# Patient Record
Sex: MALE | Race: WHITE | NOT HISPANIC OR LATINO | Employment: OTHER | URBAN - METROPOLITAN AREA
[De-identification: names, ages, dates, MRNs, and addresses within clinical notes are randomized per-mention and may not be internally consistent; named-entity substitution may affect disease eponyms.]

---

## 2017-04-26 PROBLEM — D72.10 EOSINOPHILIA: Status: ACTIVE | Noted: 2017-04-26

## 2020-08-06 ENCOUNTER — OFFICE VISIT (OUTPATIENT)
Dept: ENDOCRINOLOGY | Facility: CLINIC | Age: 75
End: 2020-08-06
Payer: MEDICARE

## 2020-08-06 VITALS
WEIGHT: 188.5 LBS | BODY MASS INDEX: 25.53 KG/M2 | DIASTOLIC BLOOD PRESSURE: 78 MMHG | HEART RATE: 66 BPM | HEIGHT: 72 IN | SYSTOLIC BLOOD PRESSURE: 132 MMHG

## 2020-08-06 DIAGNOSIS — C64.9 RENAL CELL CARCINOMA, UNSPECIFIED LATERALITY: ICD-10-CM

## 2020-08-06 DIAGNOSIS — N18.30 STAGE 3 CHRONIC KIDNEY DISEASE: ICD-10-CM

## 2020-08-06 DIAGNOSIS — E78.2 MIXED HYPERLIPIDEMIA: ICD-10-CM

## 2020-08-06 DIAGNOSIS — E11.59 TYPE 2 DIABETES MELLITUS WITH OTHER CIRCULATORY COMPLICATION, WITHOUT LONG-TERM CURRENT USE OF INSULIN: Primary | ICD-10-CM

## 2020-08-06 PROBLEM — E11.9 TYPE 2 DIABETES MELLITUS WITHOUT COMPLICATION, WITHOUT LONG-TERM CURRENT USE OF INSULIN: Status: ACTIVE | Noted: 2020-08-06

## 2020-08-06 PROCEDURE — 1126F PR PAIN SEVERITY QUANTIFIED, NO PAIN PRESENT: ICD-10-PCS | Mod: S$GLB,,, | Performed by: INTERNAL MEDICINE

## 2020-08-06 PROCEDURE — 99204 OFFICE O/P NEW MOD 45 MIN: CPT | Mod: S$GLB,,, | Performed by: INTERNAL MEDICINE

## 2020-08-06 PROCEDURE — 1126F AMNT PAIN NOTED NONE PRSNT: CPT | Mod: S$GLB,,, | Performed by: INTERNAL MEDICINE

## 2020-08-06 PROCEDURE — 99204 PR OFFICE/OUTPT VISIT, NEW, LEVL IV, 45-59 MIN: ICD-10-PCS | Mod: S$GLB,,, | Performed by: INTERNAL MEDICINE

## 2020-08-06 PROCEDURE — 1101F PR PT FALLS ASSESS DOC 0-1 FALLS W/OUT INJ PAST YR: ICD-10-PCS | Mod: CPTII,S$GLB,, | Performed by: INTERNAL MEDICINE

## 2020-08-06 PROCEDURE — 99999 PR PBB SHADOW E&M-NEW PATIENT-LVL III: ICD-10-PCS | Mod: PBBFAC,,, | Performed by: INTERNAL MEDICINE

## 2020-08-06 PROCEDURE — 1159F MED LIST DOCD IN RCRD: CPT | Mod: S$GLB,,, | Performed by: INTERNAL MEDICINE

## 2020-08-06 PROCEDURE — 99999 PR PBB SHADOW E&M-NEW PATIENT-LVL III: CPT | Mod: PBBFAC,,, | Performed by: INTERNAL MEDICINE

## 2020-08-06 PROCEDURE — 1159F PR MEDICATION LIST DOCUMENTED IN MEDICAL RECORD: ICD-10-PCS | Mod: S$GLB,,, | Performed by: INTERNAL MEDICINE

## 2020-08-06 PROCEDURE — 1101F PT FALLS ASSESS-DOCD LE1/YR: CPT | Mod: CPTII,S$GLB,, | Performed by: INTERNAL MEDICINE

## 2020-08-06 RX ORDER — INSULIN PUMP SYRINGE, 3 ML
EACH MISCELLANEOUS
Qty: 1 EACH | Refills: 1 | Status: SHIPPED | OUTPATIENT
Start: 2020-08-06

## 2020-08-06 RX ORDER — GLIPIZIDE 5 MG/1
5 TABLET, FILM COATED, EXTENDED RELEASE ORAL 2 TIMES DAILY
COMMUNITY
End: 2020-08-06

## 2020-08-06 RX ORDER — ICOSAPENT ETHYL 1000 MG/1
2 CAPSULE ORAL DAILY
Qty: 180 CAPSULE | Refills: 3 | Status: SHIPPED | OUTPATIENT
Start: 2020-08-06 | End: 2020-11-06

## 2020-08-06 RX ORDER — LANOLIN ALCOHOL/MO/W.PET/CERES
400 CREAM (GRAM) TOPICAL DAILY
COMMUNITY
End: 2021-06-23

## 2020-08-06 RX ORDER — LINAGLIPTIN 5 MG/1
5 TABLET, FILM COATED ORAL DAILY
Qty: 90 TABLET | Refills: 3 | Status: SHIPPED | OUTPATIENT
Start: 2020-08-06 | End: 2020-10-15 | Stop reason: SDUPTHER

## 2020-08-06 RX ORDER — OMEPRAZOLE 20 MG/1
20 CAPSULE, DELAYED RELEASE ORAL DAILY
COMMUNITY

## 2020-08-06 RX ORDER — GLIPIZIDE 10 MG/1
10 TABLET, FILM COATED, EXTENDED RELEASE ORAL 2 TIMES DAILY
Qty: 60 TABLET | Refills: 11
Start: 2020-08-06 | End: 2020-09-28 | Stop reason: SDUPTHER

## 2020-08-06 RX ORDER — ASPIRIN 81 MG/1
81 TABLET ORAL DAILY
COMMUNITY
End: 2023-06-05

## 2020-08-06 NOTE — ASSESSMENT & PLAN NOTE
History of renal cell carcinoma status post partial nephrectomy years ago.  Per patient has been disease free for years. Now with declining egfr.  Follow-up with nephrology and urology.

## 2020-08-06 NOTE — PATIENT INSTRUCTIONS
1. Stop Jardiance    2. Establish care with renal/nephrology    3. Continue glipizide 10 mg BID    4. Start Tradjenta    5. Check BG 3x/wk and alternate time of day    6. Start   for triglycerides    7. Eat diabetic diet. Limit carbs to 30-45 grams per meal. Reduce snacking.

## 2020-08-06 NOTE — ASSESSMENT & PLAN NOTE
BG not at goal and last HbA1C 7.7%.   DM compliations include CKD stage 3 and CAD s/p PCIs.  Pt with worsening CKD.   Stop Jardiance since baseline egfr was <45 at onset and pt with declining renal function.   Continue glipizide 10 mg BID  Start Tradjenta 5 mg daily  Check BG 3x/wk and alternate time of day  Call MD in 2-3 weeks with blood glucose log if BG >180 or having lows.  Discussed if pt having persistent hyperglycemia, next step would be adding basal insulin.   Needs eye exam   Get records with last urine microalbumin.  Discussed proper foot care. Send to podiatry for foot care.  Counseled pt on low carb/low fat diet and to increase physical activity.  On statin

## 2020-08-06 NOTE — ASSESSMENT & PLAN NOTE
Advised pt, needs renal follow up. Pt with ? KATY on CKD vs progressive worsening of CKD. Avoid nephrotoxics. Stop Jardiance due to low egfr.

## 2020-08-06 NOTE — PROGRESS NOTES
"    Subjective:    Patient ID:  Felix Sheridan is a 74 y.o. male.    Chief Complaint:  Diabetes      Pt presents to establish care for T2 DM. Pt is accompanied by his wife who also provided some history.     With regards to the diabetes:    Onset of Type 2 diabetes mellitus was in 1970's. Initially managed for many years with lifestyle medications. Started pills in . [facility]     Known diabetic complications: cardiovascular disease s/p PCI in heart. Has pacemaker.   History of DKA: no  Cardiovascular risk factors: advanced age (older than 55 for men, 65 for women), diabetes mellitus, dyslipidemia, hypertension, male gender and sedentary lifestyle    Currently takin) Glipizide 10 mg BID  2) Jardiance 12.5 mg daily     Last HbA1C some where around 7.7% 2020 with Dr. Song (cardiology).   In the past took Metformin and glipizide. Last month, kidney function was reduced and provider told pt to stop metformin and increase glipizide to 10 mg daily. Was also prescribed Jardiance at that time. Notes he self adjusted medication and doubled glipizide to 10 mg daily due to persistent hyperglycemia and has continued same dose of Jardiance.     Home blood sugar records:   Fasting 120-202  Pre-lunch -  Pre-dinner -  Bedtime 254    Current diet: in general, a "healthy" diet  , on average, 2-3 meals per day. Usually skips breakfast. Typical lunch- sandwich on wheat bread. Eats vegetables. Dinner is typically snacks. Snacks on popcorn and ritz crackers. Drinks water and 2 12 oz beers per day.   Current exercise: walking about 3 miles 5 days per week  Any episodes of hypoglycemia? no  Last eye exam: 2020. Had cataracts no DR    Reviewed most recent labs 20 which show:  Hg 12.9 nl 13-17  BUN 33 H  Cr 1.73 H  egfr 38 L  K 5.4 H  AST 24  ALT 16  Cholesterol 156   H  LDL 73  Hb A1C 7.7  TSH 2.98          Review of Systems   Constitutional: Positive for fatigue. Negative for unexpected " weight change.   Eyes: Negative for visual disturbance.   Respiratory: Positive for shortness of breath.    Cardiovascular: Negative for chest pain and leg swelling.   Gastrointestinal: Negative for abdominal pain.   Endocrine: Negative for polydipsia, polyphagia and polyuria.   Musculoskeletal: Negative for myalgias.   Skin: Negative for wound.   Neurological: Negative for numbness and headaches.   Psychiatric/Behavioral: Negative for sleep disturbance.            Diabetes Management Status    Statin: Taking  ACE/ARB: Not taking    Screening or Prevention Patient's value Goal Complete/Controlled?   HgA1C Testing and Control   No results found for: HGBA1C   Annually/Less than 8% No   Lipid profile Most Recent Lipid Panel Health Maintenance Topic Completion: Not Found Annually No   LDL control No results found for: LDLCALC Annually/Less than 100 mg/dl  No   Nephropathy screening No results found for: LABMICR  No results found for: PROTEINUA Annually No   Blood pressure BP Readings from Last 1 Encounters:   20 132/78    Less than 140/90 No   Dilated retinal exam Most Recent Eye Exam Date: Not Found Annually Yes   Foot exam   Most Recent Foot Exam Date: Not Found Annually Yes        Past Medical History:   Diagnosis Date    Cardiovascular disease     Chronic kidney disease     Hyperlipidemia     Renal cancer       Social History     Tobacco Use    Smoking status: Former Smoker     Years: 12.00     Quit date:      Years since quittin.6   Substance Use Topics    Alcohol use: Yes     Alcohol/week: 2.0 standard drinks     Types: 2 Cans of beer per week     Frequency: 4 or more times a week     Drinks per session: 3 or 4     Binge frequency: Less than monthly     Comment: 12 oz    Drug use: Never     Family History   Problem Relation Age of Onset    Diabetes Mother     Heart disease Mother     Colon cancer Mother     Parkinsonism Father       Past Surgical History:   Procedure Laterality Date     HERNIA REPAIR      PARTIAL NEPHRECTOMY            Current Outpatient Medications:     aspirin (ECOTRIN) 81 MG EC tablet, Take 81 mg by mouth once daily., Disp: , Rfl:     carvedilol (COREG) 12.5 MG tablet, , Disp: , Rfl:     clopidogrel (PLAVIX) 75 mg tablet, , Disp: , Rfl:     isosorbide mononitrate (IMDUR) 30 MG 24 hr tablet, , Disp: , Rfl:     magnesium oxide (MAG-OX) 400 mg (241.3 mg magnesium) tablet, Take 400 mg by mouth once daily., Disp: , Rfl:     omeprazole (PRILOSEC) 20 MG capsule, Take 20 mg by mouth once daily., Disp: , Rfl:     pravastatin (PRAVACHOL) 40 MG tablet, , Disp: , Rfl:     blood sugar diagnostic Strp, Checks bg 3 times per week. Alternate time of day.  Dispense with lancets, Disp: 50 strip, Rfl: 3    blood-glucose meter kit, Use as instructed, Disp: 1 each, Rfl: 1    glipiZIDE (GLUCOTROL) 10 MG TR24, Take 1 tablet (10 mg total) by mouth 2 (two) times a day., Disp: 60 tablet, Rfl: 11    icosapent ethyL (VASCEPA) 1 gram Cap, Take 2 tablets by mouth once daily., Disp: 180 capsule, Rfl: 3    linaGLIPtin (TRADJENTA) 5 mg Tab tablet, Take 1 tablet (5 mg total) by mouth once daily., Disp: 90 tablet, Rfl: 3    omega-3 fatty acids 1,000 mg Cap, Take 2 capsules (2,000 mg total) by mouth 2 (two) times a day., Disp: 360 capsule, Rfl: 3     Review of patient's allergies indicates:  No Known Allergies     Objective:   /78 (BP Location: Right arm, Patient Position: Sitting, BP Method: Large (Manual))   Pulse 66   Ht 6' (1.829 m)   Wt 85.5 kg (188 lb 7.9 oz)   BMI 25.56 kg/m²   BP Readings from Last 3 Encounters:   08/06/20 132/78   10/25/17 (!) 146/81   04/26/17 132/68     Wt Readings from Last 3 Encounters:   08/06/20 1311 85.5 kg (188 lb 7.9 oz)   10/25/17 1026 86.2 kg (190 lb)   04/26/17 1049 87.1 kg (192 lb)          Physical Exam  Vitals signs reviewed.   Constitutional:       General: He is not in acute distress.     Appearance: Normal appearance. He is well-developed. He is  not ill-appearing.      Comments: Elderly male, appears stated age.   HENT:      Head: Normocephalic and atraumatic.      Nose: Nose normal.   Eyes:      General: No scleral icterus.  Neck:      Thyroid: No thyromegaly.      Trachea: No tracheal deviation.   Cardiovascular:      Rate and Rhythm: Normal rate and regular rhythm.      Heart sounds: Normal heart sounds. No murmur.   Pulmonary:      Effort: Pulmonary effort is normal. No respiratory distress.      Breath sounds: Normal breath sounds. No wheezing or rales.   Abdominal:      General: Bowel sounds are normal. There is no distension.      Palpations: Abdomen is soft.      Tenderness: There is no abdominal tenderness.   Musculoskeletal:         General: No swelling.   Lymphadenopathy:      Cervical: No cervical adenopathy.   Skin:     General: Skin is warm.      Comments: Foot exam:  +1DP/PT pulses bilat, ulcer over L plantar foot, thick calluses over bilat medial feet, abnl microfilament bilat, mycotic toe nails bilat, dry feet bilat   Neurological:      Mental Status: He is alert and oriented to person, place, and time.      Cranial Nerves: No cranial nerve deficit.      Deep Tendon Reflexes: Reflexes normal.   Psychiatric:         Thought Content: Thought content normal.           No results found for: NA, K, CL, CO2, BUN, CREATININE, GLU, HGBA1C, MG, AST, ALT, ALBUMIN, PROT, BILITOT, WBC, HGB, HCT, MCV, MCH, PLT, MPV, GRAN, LYMPH, CHOL, HDL, LDLCALC, TRIG    No results found for: TSH, V7ITCUM, R1SVSMX, FREET4, THYROIDAB     No flowsheet data found.      No results found for: HGBA1C      Assessment and plan:       Problem List Items Addressed This Visit        Cardiac/Vascular    Mixed hyperlipidemia    Current Assessment & Plan     LDL near goal.  Continue statin. Add fish oil for increased Tgs. F/u with cards.         Relevant Medications    icosapent ethyL (VASCEPA) 1 gram Cap       Renal/    Stage 3 chronic kidney disease    Current Assessment & Plan      Advised pt, needs renal follow up. Pt with ? KATY on CKD vs progressive worsening of CKD. Avoid nephrotoxics. Stop Jardiance due to low egfr.            Oncology    Renal cell carcinoma    Current Assessment & Plan     History of renal cell carcinoma status post partial nephrectomy years ago.  Per patient has been disease free for years. Now with declining egfr.  Follow-up with nephrology and urology.            Endocrine    Type 2 diabetes mellitus with circulatory disorder, without long-term current use of insulin - Primary    Current Assessment & Plan     BG not at goal and last HbA1C 7.7%.   DM compliations include CKD stage 3 and CAD s/p PCIs.  Pt with worsening CKD.   Stop Jardiance since baseline egfr was <45 at onset and pt with declining renal function.   Continue glipizide 10 mg BID  Start Tradjenta 5 mg daily  Check BG 3x/wk and alternate time of day  Call MD in 2-3 weeks with blood glucose log if BG >180 or having lows.  Discussed if pt having persistent hyperglycemia, next step would be adding basal insulin.   Needs eye exam   Get records with last urine microalbumin.  Discussed proper foot care. Send to podiatry for foot care.  Counseled pt on low carb/low fat diet and to increase physical activity.  On statin           Relevant Medications    linaGLIPtin (TRADJENTA) 5 mg Tab tablet    glipiZIDE (GLUCOTROL) 10 MG TR24    blood-glucose meter kit    blood sugar diagnostic Strp    Other Relevant Orders    Ambulatory referral/consult to Podiatry    Hemoglobin A1C           Schedule with podiatry    RTC in 3 months with HbA1C

## 2020-08-07 DIAGNOSIS — E78.2 MIXED HYPERLIPIDEMIA: Primary | ICD-10-CM

## 2020-08-07 PROBLEM — E11.59 TYPE 2 DIABETES MELLITUS WITH CIRCULATORY DISORDER, WITHOUT LONG-TERM CURRENT USE OF INSULIN: Status: ACTIVE | Noted: 2020-08-06

## 2020-08-07 RX ORDER — OMEGA-3 FATTY ACIDS 1000 MG
2 CAPSULE ORAL 2 TIMES DAILY
Qty: 360 CAPSULE | Refills: 3 | Status: SHIPPED | OUTPATIENT
Start: 2020-08-07 | End: 2020-11-06

## 2020-08-21 LAB — HBA1C MFR BLD: 7.5 % OF TOTAL HGB

## 2020-08-24 ENCOUNTER — PATIENT MESSAGE (OUTPATIENT)
Dept: ENDOCRINOLOGY | Facility: CLINIC | Age: 75
End: 2020-08-24

## 2020-09-02 ENCOUNTER — TELEPHONE (OUTPATIENT)
Dept: ENDOCRINOLOGY | Facility: CLINIC | Age: 75
End: 2020-09-02

## 2020-09-02 NOTE — TELEPHONE ENCOUNTER
"S/w pt, informed of Dr. Sandifer's message below. Verbalized understanding.      "Your most recent hemoglobin A1C was 7.5%. Please continue your diabetes medications as we previously discussed. Dr. Sandifer"     "

## 2020-09-28 DIAGNOSIS — E11.59 TYPE 2 DIABETES MELLITUS WITH OTHER CIRCULATORY COMPLICATION, WITHOUT LONG-TERM CURRENT USE OF INSULIN: ICD-10-CM

## 2020-09-28 NOTE — TELEPHONE ENCOUNTER
----- Message from Jose Alcala sent at 9/28/2020  9:35 AM CDT -----  Regarding: rx  Contact: wife  Type:  RX Refill Request    Who Called:  wife-Fela Sheridan  Refill or New Rx:  refill  RX Name and Strength: glipiZIDE (GLUCOTROL) 10 MG TR24  How is the patient currently taking it? (ex. 1XDay):   Is this a 30 day or 90 day RX:    Preferred Pharmacy with phone number:  [facility] Local or Mail Order:   mail order Ordering Provider:  Dr Sandifer Best Call Back Number:  [phone]  Additional Information:

## 2020-09-29 RX ORDER — GLIPIZIDE 10 MG/1
10 TABLET, FILM COATED, EXTENDED RELEASE ORAL 2 TIMES DAILY
Qty: 60 TABLET | Refills: 11 | Status: SHIPPED | OUTPATIENT
Start: 2020-09-29 | End: 2020-11-06

## 2020-10-15 ENCOUNTER — PATIENT MESSAGE (OUTPATIENT)
Dept: ENDOCRINOLOGY | Facility: CLINIC | Age: 75
End: 2020-10-15

## 2020-10-15 DIAGNOSIS — E11.59 TYPE 2 DIABETES MELLITUS WITH OTHER CIRCULATORY COMPLICATION, WITHOUT LONG-TERM CURRENT USE OF INSULIN: ICD-10-CM

## 2020-10-15 RX ORDER — LINAGLIPTIN 5 MG/1
5 TABLET, FILM COATED ORAL DAILY
Qty: 90 TABLET | Refills: 3 | Status: SHIPPED | OUTPATIENT
Start: 2020-10-15 | End: 2021-09-17

## 2020-11-06 ENCOUNTER — OFFICE VISIT (OUTPATIENT)
Dept: ENDOCRINOLOGY | Facility: CLINIC | Age: 75
End: 2020-11-06
Payer: MEDICARE

## 2020-11-06 VITALS
SYSTOLIC BLOOD PRESSURE: 136 MMHG | HEART RATE: 76 BPM | BODY MASS INDEX: 25.26 KG/M2 | HEIGHT: 72 IN | RESPIRATION RATE: 18 BRPM | WEIGHT: 186.5 LBS | DIASTOLIC BLOOD PRESSURE: 84 MMHG

## 2020-11-06 DIAGNOSIS — N18.30 STAGE 3 CHRONIC KIDNEY DISEASE, UNSPECIFIED WHETHER STAGE 3A OR 3B CKD: ICD-10-CM

## 2020-11-06 DIAGNOSIS — E11.59 TYPE 2 DIABETES MELLITUS WITH OTHER CIRCULATORY COMPLICATION, WITHOUT LONG-TERM CURRENT USE OF INSULIN: Primary | ICD-10-CM

## 2020-11-06 DIAGNOSIS — E78.2 MIXED HYPERLIPIDEMIA: ICD-10-CM

## 2020-11-06 DIAGNOSIS — C64.9 RENAL CELL CARCINOMA, UNSPECIFIED LATERALITY: ICD-10-CM

## 2020-11-06 PROCEDURE — 3051F HG A1C>EQUAL 7.0%<8.0%: CPT | Mod: CPTII,S$GLB,, | Performed by: INTERNAL MEDICINE

## 2020-11-06 PROCEDURE — 99999 PR PBB SHADOW E&M-EST. PATIENT-LVL III: ICD-10-PCS | Mod: PBBFAC,,, | Performed by: INTERNAL MEDICINE

## 2020-11-06 PROCEDURE — 1101F PR PT FALLS ASSESS DOC 0-1 FALLS W/OUT INJ PAST YR: ICD-10-PCS | Mod: CPTII,S$GLB,, | Performed by: INTERNAL MEDICINE

## 2020-11-06 PROCEDURE — 1101F PT FALLS ASSESS-DOCD LE1/YR: CPT | Mod: CPTII,S$GLB,, | Performed by: INTERNAL MEDICINE

## 2020-11-06 PROCEDURE — 1159F MED LIST DOCD IN RCRD: CPT | Mod: S$GLB,,, | Performed by: INTERNAL MEDICINE

## 2020-11-06 PROCEDURE — 3051F PR MOST RECENT HEMOGLOBIN A1C LEVEL 7.0 - < 8.0%: ICD-10-PCS | Mod: CPTII,S$GLB,, | Performed by: INTERNAL MEDICINE

## 2020-11-06 PROCEDURE — 99214 OFFICE O/P EST MOD 30 MIN: CPT | Mod: S$GLB,,, | Performed by: INTERNAL MEDICINE

## 2020-11-06 PROCEDURE — 99214 PR OFFICE/OUTPT VISIT, EST, LEVL IV, 30-39 MIN: ICD-10-PCS | Mod: S$GLB,,, | Performed by: INTERNAL MEDICINE

## 2020-11-06 PROCEDURE — 1126F PR PAIN SEVERITY QUANTIFIED, NO PAIN PRESENT: ICD-10-PCS | Mod: S$GLB,,, | Performed by: INTERNAL MEDICINE

## 2020-11-06 PROCEDURE — 1159F PR MEDICATION LIST DOCUMENTED IN MEDICAL RECORD: ICD-10-PCS | Mod: S$GLB,,, | Performed by: INTERNAL MEDICINE

## 2020-11-06 PROCEDURE — 1126F AMNT PAIN NOTED NONE PRSNT: CPT | Mod: S$GLB,,, | Performed by: INTERNAL MEDICINE

## 2020-11-06 PROCEDURE — 99999 PR PBB SHADOW E&M-EST. PATIENT-LVL III: CPT | Mod: PBBFAC,,, | Performed by: INTERNAL MEDICINE

## 2020-11-06 RX ORDER — NEBIVOLOL 2.5 MG/1
TABLET ORAL
COMMUNITY
Start: 2020-09-23 | End: 2022-02-25 | Stop reason: DRUGHIGH

## 2020-11-06 RX ORDER — GLIPIZIDE 5 MG/1
TABLET, FILM COATED, EXTENDED RELEASE ORAL
Qty: 270 TABLET | Refills: 3 | Status: SHIPPED | OUTPATIENT
Start: 2020-11-06 | End: 2021-06-23

## 2020-11-06 NOTE — ASSESSMENT & PLAN NOTE
Continue statin. TG mildly elevated. Pt not on fish oil after advice from Nephrology. Advised pt to reduce daily beer consumption. Continue statin. F/u with cards.

## 2020-11-06 NOTE — ASSESSMENT & PLAN NOTE
History of renal cell carcinoma status post partial nephrectomy years ago.  Per patient has been disease free for years. Now with declining egfr. Continue follow-up with nephrology and urology.

## 2020-11-06 NOTE — ASSESSMENT & PLAN NOTE
Avoid nephrotoxics.Advised pt if cost of Tradjenta is too expensive, can switch to another DPP4 but prefer this one given his fluctuating egfr. F/u with nephro

## 2020-11-06 NOTE — PROGRESS NOTES
"    Subjective:    Patient ID:  Felix Sheridan is a 75 y.o. male.    Chief Complaint:  Diabetes      Pt presents to follow up for T2 DM. Pt is accompanied by his wife who also provided some history.      With regards to the diabetes:     Onset of Type 2 diabetes mellitus was in 1970's. Initially managed for many years with lifestyle medications. Started pills in . [facility]     Since last visit had stress test which showed inferior defect and received vessel dilation. Sees cardiology here in [place].  Has another procedure scheduled for next week. Starting seeing nephro who recommended he did not start fish oil due to concern that pt also taking Plavix. Notes Tradjenta is some what expensive. [facility] so unsure if they will [facility] the medication.       Known diabetic complications: cardiovascular disease s/p PCI in heart. Has pacemaker.   History of DKA: no  Cardiovascular risk factors: advanced age (older than 55 for men, 65 for women), diabetes mellitus, dyslipidemia, hypertension, male gender and sedentary lifestyle     Currently takin) Glipizide 10 mg BID  2) Tradjenta 5 mg    Last HbA1C 7.5% Aug 2020. In the past took Metformin but had fluctuations in  kidney function. Previously on Jardiance but we stopped at last visit due to egfr of 38.     Home blood sugar records:   Fasting 113-158  Pre-lunch 129-189  Pre-dinner -  Bedtime . Had few episodes of 49-50's overnight     Current diet: in general, a "healthy" diet  , on average, 2-3 meals per day. Usually skips breakfast or just has some dry toast. Typical lunch- sandwich on wheat bread. Eats vegetables. Dinner is typically snacks. Cut back on Ritz crackers and popcorn. Drinks water and 2-3 12 oz beers per day.   Current exercise: walking about 3 miles 5 days per week  Any episodes of hypoglycemia? no  Last eye exam: 2020. Had cataracts no DR     Past  labs 20 which show:  Hg 12.9 nl " 13-17  BUN 33 H  Cr 1.73 H  egfr 38 L  K 5.4 H  AST 24  ALT 16  Cholesterol 156   H  LDL 73  Hb A1C 7.7  TSH 2.98                 Diabetes Management Status    Statin: Taking  ACE/ARB: Not taking    Screening or Prevention Patient's value Goal Complete/Controlled?   HgA1C Testing and Control   Lab Results   Component Value Date    HGBA1C 7.5 (H) 2020      Annually/Less than 8% Yes   Lipid profile : 2020 Annually No   LDL control No results found for: LDLCALC Annually/Less than 100 mg/dl  No   Nephropathy screening No results found for: LABMICR  No results found for: PROTEINUA Annually No   Blood pressure BP Readings from Last 1 Encounters:   20 136/84    Less than 140/90 Yes   Dilated retinal exam : 2020 Annually No   Foot exam   : 2020 Annually No       Review of Systems   Constitutional: Negative for fatigue and unexpected weight change.   Eyes: Negative for visual disturbance.   Respiratory: Negative for shortness of breath.    Cardiovascular: Negative for chest pain and leg swelling.   Gastrointestinal: Negative for abdominal pain.   Endocrine: Positive for polydipsia. Negative for polyphagia and polyuria.   Musculoskeletal: Negative for myalgias.   Skin: Negative for wound.   Neurological: Negative for numbness and headaches.   Psychiatric/Behavioral: Negative for sleep disturbance.        Past Medical History:   Diagnosis Date    Cardiovascular disease     Chronic kidney disease     Hyperlipidemia     Renal cancer       Social History     Tobacco Use    Smoking status: Former Smoker     Years: 12.00     Quit date:      Years since quittin.8   Substance Use Topics    Alcohol use: Yes     Alcohol/week: 2.0 standard drinks     Types: 2 Cans of beer per week     Frequency: 4 or more times a week     Drinks per session: 3 or 4     Binge frequency: Less than monthly     Comment: 12 oz    Drug use: Never     Family History   Problem Relation Age of Onset     Diabetes Mother     Heart disease Mother     Colon cancer Mother     Parkinsonism Father       Past Surgical History:   Procedure Laterality Date    HERNIA REPAIR      PARTIAL NEPHRECTOMY            Current Outpatient Medications:     aspirin (ECOTRIN) 81 MG EC tablet, Take 81 mg by mouth once daily., Disp: , Rfl:     blood sugar diagnostic Strp, Checks bg 3 times per week. Alternate time of day.  Dispense with lancets, Disp: 50 strip, Rfl: 3    clopidogrel (PLAVIX) 75 mg tablet, , Disp: , Rfl:     isosorbide mononitrate (IMDUR) 30 MG 24 hr tablet, , Disp: , Rfl:     lancets (ACCU-CHEK FASTCLIX LANCET DRUM) Misc, Check blood sugar once daily., Disp: 100 each, Rfl: 3    linaGLIPtin (TRADJENTA) 5 mg Tab tablet, Take 1 tablet (5 mg total) by mouth once daily., Disp: 90 tablet, Rfl: 3    nebivoloL (BYSTOLIC) 2.5 MG Tab, , Disp: , Rfl:     omeprazole (PRILOSEC) 20 MG capsule, Take 20 mg by mouth once daily., Disp: , Rfl:     pravastatin (PRAVACHOL) 40 MG tablet, , Disp: , Rfl:     blood-glucose meter kit, Use as instructed, Disp: 1 each, Rfl: 1    carvedilol (COREG) 12.5 MG tablet, , Disp: , Rfl:     glipiZIDE (GLUCOTROL) 5 MG TR24, Take two tablets with breakfast and one tablet with dinner., Disp: 270 tablet, Rfl: 3    magnesium oxide (MAG-OX) 400 mg (241.3 mg magnesium) tablet, Take 400 mg by mouth once daily., Disp: , Rfl:      Review of patient's allergies indicates:  No Known Allergies     Objective:   /84   Pulse 76   Resp 18   Ht 6' (1.829 m)   Wt 84.6 kg (186 lb 8.2 oz)   BMI 25.30 kg/m²   BP Readings from Last 3 Encounters:   11/06/20 136/84   08/06/20 132/78   10/25/17 (!) 146/81     Wt Readings from Last 3 Encounters:   11/06/20 0922 84.6 kg (186 lb 8.2 oz)   08/06/20 1311 85.5 kg (188 lb 7.9 oz)   10/25/17 1026 86.2 kg (190 lb)          Physical Exam  Vitals signs reviewed.   Constitutional:       General: He is not in acute distress.     Appearance: Normal appearance. He is  well-developed. He is not ill-appearing, toxic-appearing or diaphoretic.   HENT:      Head: Normocephalic and atraumatic.      Right Ear: External ear normal.      Left Ear: External ear normal.   Eyes:      General: No scleral icterus.  Neck:      Trachea: No tracheal deviation.   Pulmonary:      Effort: Pulmonary effort is normal. No respiratory distress.   Abdominal:      General: There is no distension.   Neurological:      General: No focal deficit present.      Mental Status: He is alert and oriented to person, place, and time.   Psychiatric:         Thought Content: Thought content normal.           Lab Results   Component Value Date    HGBA1C 7.5 (H) 08/20/2020       No results found for: TSH, T3NTLZW, I5KSXMY, FREET4, THYROIDAB     No flowsheet data found.        Hemoglobin A1C   Date Value Ref Range Status   08/20/2020 7.5 (H) <5.7 % of total Hgb Final     Comment:     For someone without known diabetes, a hemoglobin A1c  value of 6.5% or greater indicates that they may have   diabetes and this should be confirmed with a follow-up   test.     For someone with known diabetes, a value <7% indicates   that their diabetes is well controlled and a value   greater than or equal to 7% indicates suboptimal   control. A1c targets should be individualized based on   duration of diabetes, age, comorbid conditions, and   other considerations.     Currently, no consensus exists regarding use of  hemoglobin A1c for diagnosis of diabetes for children.               Assessment and plan:       Problem List Items Addressed This Visit        Cardiac/Vascular    Mixed hyperlipidemia    Current Assessment & Plan     Continue statin. TG mildly elevated. Pt not on fish oil after advice from Nephrology. Advised pt to reduce daily beer consumption. Continue statin. F/u with cards.            Renal/    Stage 3 chronic kidney disease    Current Assessment & Plan     Avoid nephrotoxics.Advised pt if cost of Tradjenta is too  expensive, can switch to another DPP4 but prefer this one given his fluctuating egfr. F/u with nephro            Oncology    Renal cell carcinoma    Current Assessment & Plan     History of renal cell carcinoma status post partial nephrectomy years ago.  Per patient has been disease free for years. Now with declining egfr. Continue follow-up with nephrology and urology.            Endocrine    Type 2 diabetes mellitus with circulatory disorder, without long-term current use of insulin - Primary    Current Assessment & Plan     Complicated by coronary artery disease status post multiple PCI with recent intervention.  BG near goal except patient with hypoglycemia overnight.  HbA1C 8.1% three weeks ago  Continue glipizide 10 mg in am. Reduce to 5 mg in the evening.  Alternate checking BG before meals and bedtime a few times per week. Bring log to next visit.  Up to date with eye exam   Check urine microalbumin.  Discussed proper foot care.  Counseled pt on low carb/low fat diet and to increase physical activity.  On statin           Relevant Medications    glipiZIDE (GLUCOTROL) 5 MG TR24    Other Relevant Orders    CBC Auto Differential    Microalbumin/Creatinine Ratio, Urine            Urine today  Print out cbc for pt to take with him. Is getting cardiac procedure next week. He will bring this and other test results to next visit.  RTC in 4 months with any diabetic NP

## 2020-11-06 NOTE — ASSESSMENT & PLAN NOTE
Complicated by coronary artery disease status post multiple PCI with recent intervention.  BG near goal except patient with hypoglycemia overnight.  HbA1C 8.1% three weeks ago  Continue glipizide 10 mg in am. Reduce to 5 mg in the evening.  Alternate checking BG before meals and bedtime a few times per week. Bring log to next visit.  Up to date with eye exam   Check urine microalbumin.  Discussed proper foot care.  Counseled pt on low carb/low fat diet and to increase physical activity.  On statin

## 2020-11-10 ENCOUNTER — PATIENT MESSAGE (OUTPATIENT)
Dept: ENDOCRINOLOGY | Facility: CLINIC | Age: 75
End: 2020-11-10

## 2021-02-02 DIAGNOSIS — E11.59 TYPE 2 DIABETES MELLITUS WITH OTHER CIRCULATORY COMPLICATION, WITHOUT LONG-TERM CURRENT USE OF INSULIN: ICD-10-CM

## 2021-02-03 RX ORDER — BLOOD SUGAR DIAGNOSTIC
STRIP MISCELLANEOUS
Qty: 50 STRIP | Refills: 3 | Status: SHIPPED | OUTPATIENT
Start: 2021-02-03 | End: 2021-09-17 | Stop reason: SDUPTHER

## 2021-03-15 ENCOUNTER — LAB VISIT (OUTPATIENT)
Dept: LAB | Facility: HOSPITAL | Age: 76
End: 2021-03-15
Attending: NURSE PRACTITIONER
Payer: MEDICARE

## 2021-03-15 ENCOUNTER — OFFICE VISIT (OUTPATIENT)
Dept: ENDOCRINOLOGY | Facility: CLINIC | Age: 76
End: 2021-03-15
Payer: MEDICARE

## 2021-03-15 VITALS
WEIGHT: 190.69 LBS | HEIGHT: 72 IN | HEART RATE: 55 BPM | SYSTOLIC BLOOD PRESSURE: 120 MMHG | BODY MASS INDEX: 25.83 KG/M2 | DIASTOLIC BLOOD PRESSURE: 80 MMHG

## 2021-03-15 DIAGNOSIS — E11.22 TYPE 2 DIABETES MELLITUS WITH STAGE 3 CHRONIC KIDNEY DISEASE, WITHOUT LONG-TERM CURRENT USE OF INSULIN, UNSPECIFIED WHETHER STAGE 3A OR 3B CKD: Primary | ICD-10-CM

## 2021-03-15 DIAGNOSIS — I25.10 CORONARY ARTERY DISEASE INVOLVING NATIVE CORONARY ARTERY OF NATIVE HEART WITHOUT ANGINA PECTORIS: ICD-10-CM

## 2021-03-15 DIAGNOSIS — N18.30 TYPE 2 DIABETES MELLITUS WITH STAGE 3 CHRONIC KIDNEY DISEASE, WITHOUT LONG-TERM CURRENT USE OF INSULIN, UNSPECIFIED WHETHER STAGE 3A OR 3B CKD: ICD-10-CM

## 2021-03-15 DIAGNOSIS — N18.30 TYPE 2 DIABETES MELLITUS WITH STAGE 3 CHRONIC KIDNEY DISEASE, WITHOUT LONG-TERM CURRENT USE OF INSULIN, UNSPECIFIED WHETHER STAGE 3A OR 3B CKD: Primary | ICD-10-CM

## 2021-03-15 DIAGNOSIS — E78.2 MIXED HYPERLIPIDEMIA: ICD-10-CM

## 2021-03-15 DIAGNOSIS — E11.22 TYPE 2 DIABETES MELLITUS WITH STAGE 3 CHRONIC KIDNEY DISEASE, WITHOUT LONG-TERM CURRENT USE OF INSULIN, UNSPECIFIED WHETHER STAGE 3A OR 3B CKD: ICD-10-CM

## 2021-03-15 PROCEDURE — 1126F PR PAIN SEVERITY QUANTIFIED, NO PAIN PRESENT: ICD-10-PCS | Mod: S$GLB,,, | Performed by: NURSE PRACTITIONER

## 2021-03-15 PROCEDURE — 1101F PT FALLS ASSESS-DOCD LE1/YR: CPT | Mod: CPTII,S$GLB,, | Performed by: NURSE PRACTITIONER

## 2021-03-15 PROCEDURE — 1101F PR PT FALLS ASSESS DOC 0-1 FALLS W/OUT INJ PAST YR: ICD-10-PCS | Mod: CPTII,S$GLB,, | Performed by: NURSE PRACTITIONER

## 2021-03-15 PROCEDURE — 3288F FALL RISK ASSESSMENT DOCD: CPT | Mod: CPTII,S$GLB,, | Performed by: NURSE PRACTITIONER

## 2021-03-15 PROCEDURE — 83036 HEMOGLOBIN GLYCOSYLATED A1C: CPT | Performed by: NURSE PRACTITIONER

## 2021-03-15 PROCEDURE — 1126F AMNT PAIN NOTED NONE PRSNT: CPT | Mod: S$GLB,,, | Performed by: NURSE PRACTITIONER

## 2021-03-15 PROCEDURE — 80053 COMPREHEN METABOLIC PANEL: CPT | Performed by: NURSE PRACTITIONER

## 2021-03-15 PROCEDURE — 1159F MED LIST DOCD IN RCRD: CPT | Mod: S$GLB,,, | Performed by: NURSE PRACTITIONER

## 2021-03-15 PROCEDURE — 99214 PR OFFICE/OUTPT VISIT, EST, LEVL IV, 30-39 MIN: ICD-10-PCS | Mod: S$GLB,,, | Performed by: NURSE PRACTITIONER

## 2021-03-15 PROCEDURE — 99999 PR PBB SHADOW E&M-EST. PATIENT-LVL IV: ICD-10-PCS | Mod: PBBFAC,,, | Performed by: NURSE PRACTITIONER

## 2021-03-15 PROCEDURE — 3051F HG A1C>EQUAL 7.0%<8.0%: CPT | Mod: CPTII,S$GLB,, | Performed by: NURSE PRACTITIONER

## 2021-03-15 PROCEDURE — 36415 COLL VENOUS BLD VENIPUNCTURE: CPT | Mod: PO | Performed by: NURSE PRACTITIONER

## 2021-03-15 PROCEDURE — 99999 PR PBB SHADOW E&M-EST. PATIENT-LVL IV: CPT | Mod: PBBFAC,,, | Performed by: NURSE PRACTITIONER

## 2021-03-15 PROCEDURE — 99214 OFFICE O/P EST MOD 30 MIN: CPT | Mod: S$GLB,,, | Performed by: NURSE PRACTITIONER

## 2021-03-15 PROCEDURE — 3288F PR FALLS RISK ASSESSMENT DOCUMENTED: ICD-10-PCS | Mod: CPTII,S$GLB,, | Performed by: NURSE PRACTITIONER

## 2021-03-15 PROCEDURE — 1159F PR MEDICATION LIST DOCUMENTED IN MEDICAL RECORD: ICD-10-PCS | Mod: S$GLB,,, | Performed by: NURSE PRACTITIONER

## 2021-03-15 PROCEDURE — 3051F PR MOST RECENT HEMOGLOBIN A1C LEVEL 7.0 - < 8.0%: ICD-10-PCS | Mod: CPTII,S$GLB,, | Performed by: NURSE PRACTITIONER

## 2021-03-15 RX ORDER — LANOLIN ALCOHOL/MO/W.PET/CERES
100 CREAM (GRAM) TOPICAL DAILY
COMMUNITY

## 2021-03-16 LAB
ALBUMIN SERPL BCP-MCNC: 4.1 G/DL (ref 3.5–5.2)
ALP SERPL-CCNC: 63 U/L (ref 55–135)
ALT SERPL W/O P-5'-P-CCNC: 11 U/L (ref 10–44)
ANION GAP SERPL CALC-SCNC: 11 MMOL/L (ref 8–16)
AST SERPL-CCNC: 22 U/L (ref 10–40)
BILIRUB SERPL-MCNC: 0.7 MG/DL (ref 0.1–1)
BUN SERPL-MCNC: 19 MG/DL (ref 8–23)
CALCIUM SERPL-MCNC: 9.2 MG/DL (ref 8.7–10.5)
CHLORIDE SERPL-SCNC: 102 MMOL/L (ref 95–110)
CO2 SERPL-SCNC: 25 MMOL/L (ref 23–29)
CREAT SERPL-MCNC: 1.5 MG/DL (ref 0.5–1.4)
EST. GFR  (AFRICAN AMERICAN): 51.2 ML/MIN/1.73 M^2
EST. GFR  (NON AFRICAN AMERICAN): 44.3 ML/MIN/1.73 M^2
ESTIMATED AVG GLUCOSE: 166 MG/DL (ref 68–131)
GLUCOSE SERPL-MCNC: 159 MG/DL (ref 70–110)
HBA1C MFR BLD: 7.4 % (ref 4–5.6)
POTASSIUM SERPL-SCNC: 4.7 MMOL/L (ref 3.5–5.1)
PROT SERPL-MCNC: 7.1 G/DL (ref 6–8.4)
SODIUM SERPL-SCNC: 138 MMOL/L (ref 136–145)

## 2021-03-17 ENCOUNTER — PATIENT MESSAGE (OUTPATIENT)
Dept: ENDOCRINOLOGY | Facility: CLINIC | Age: 76
End: 2021-03-17

## 2021-06-12 ENCOUNTER — LAB VISIT (OUTPATIENT)
Dept: LAB | Facility: HOSPITAL | Age: 76
End: 2021-06-12
Attending: NURSE PRACTITIONER
Payer: MEDICARE

## 2021-06-12 DIAGNOSIS — E11.22 TYPE 2 DIABETES MELLITUS WITH STAGE 3 CHRONIC KIDNEY DISEASE, WITHOUT LONG-TERM CURRENT USE OF INSULIN, UNSPECIFIED WHETHER STAGE 3A OR 3B CKD: ICD-10-CM

## 2021-06-12 DIAGNOSIS — N18.30 TYPE 2 DIABETES MELLITUS WITH STAGE 3 CHRONIC KIDNEY DISEASE, WITHOUT LONG-TERM CURRENT USE OF INSULIN, UNSPECIFIED WHETHER STAGE 3A OR 3B CKD: ICD-10-CM

## 2021-06-12 LAB
ALBUMIN SERPL BCP-MCNC: 3.8 G/DL (ref 3.5–5.2)
ALP SERPL-CCNC: 79 U/L (ref 55–135)
ALT SERPL W/O P-5'-P-CCNC: 11 U/L (ref 10–44)
ANION GAP SERPL CALC-SCNC: 11 MMOL/L (ref 8–16)
AST SERPL-CCNC: 31 U/L (ref 10–40)
BILIRUB SERPL-MCNC: 0.7 MG/DL (ref 0.1–1)
BUN SERPL-MCNC: 25 MG/DL (ref 8–23)
CALCIUM SERPL-MCNC: 9.4 MG/DL (ref 8.7–10.5)
CHLORIDE SERPL-SCNC: 101 MMOL/L (ref 95–110)
CO2 SERPL-SCNC: 23 MMOL/L (ref 23–29)
CREAT SERPL-MCNC: 1.9 MG/DL (ref 0.5–1.4)
EST. GFR  (AFRICAN AMERICAN): 38.5 ML/MIN/1.73 M^2
EST. GFR  (NON AFRICAN AMERICAN): 33.3 ML/MIN/1.73 M^2
ESTIMATED AVG GLUCOSE: 160 MG/DL (ref 68–131)
GLUCOSE SERPL-MCNC: 172 MG/DL (ref 70–110)
HBA1C MFR BLD: 7.2 % (ref 4–5.6)
POTASSIUM SERPL-SCNC: 5 MMOL/L (ref 3.5–5.1)
PROT SERPL-MCNC: 7.1 G/DL (ref 6–8.4)
SODIUM SERPL-SCNC: 135 MMOL/L (ref 136–145)

## 2021-06-12 PROCEDURE — 80053 COMPREHEN METABOLIC PANEL: CPT | Performed by: NURSE PRACTITIONER

## 2021-06-12 PROCEDURE — 36415 COLL VENOUS BLD VENIPUNCTURE: CPT | Mod: PO | Performed by: NURSE PRACTITIONER

## 2021-06-12 PROCEDURE — 83036 HEMOGLOBIN GLYCOSYLATED A1C: CPT | Performed by: NURSE PRACTITIONER

## 2021-06-23 ENCOUNTER — OFFICE VISIT (OUTPATIENT)
Dept: ENDOCRINOLOGY | Facility: CLINIC | Age: 76
End: 2021-06-23
Payer: MEDICARE

## 2021-06-23 VITALS
SYSTOLIC BLOOD PRESSURE: 120 MMHG | HEART RATE: 54 BPM | WEIGHT: 189.63 LBS | HEIGHT: 72 IN | DIASTOLIC BLOOD PRESSURE: 60 MMHG | BODY MASS INDEX: 25.68 KG/M2

## 2021-06-23 DIAGNOSIS — E78.2 MIXED HYPERLIPIDEMIA: ICD-10-CM

## 2021-06-23 DIAGNOSIS — N18.30 TYPE 2 DIABETES MELLITUS WITH STAGE 3 CHRONIC KIDNEY DISEASE, WITHOUT LONG-TERM CURRENT USE OF INSULIN, UNSPECIFIED WHETHER STAGE 3A OR 3B CKD: Primary | ICD-10-CM

## 2021-06-23 DIAGNOSIS — E11.59 TYPE 2 DIABETES MELLITUS WITH OTHER CIRCULATORY COMPLICATION, WITHOUT LONG-TERM CURRENT USE OF INSULIN: ICD-10-CM

## 2021-06-23 DIAGNOSIS — E11.22 TYPE 2 DIABETES MELLITUS WITH STAGE 3 CHRONIC KIDNEY DISEASE, WITHOUT LONG-TERM CURRENT USE OF INSULIN, UNSPECIFIED WHETHER STAGE 3A OR 3B CKD: Primary | ICD-10-CM

## 2021-06-23 DIAGNOSIS — I25.10 CORONARY ARTERY DISEASE INVOLVING NATIVE CORONARY ARTERY OF NATIVE HEART WITHOUT ANGINA PECTORIS: ICD-10-CM

## 2021-06-23 PROCEDURE — 99214 OFFICE O/P EST MOD 30 MIN: CPT | Mod: S$GLB,,, | Performed by: NURSE PRACTITIONER

## 2021-06-23 PROCEDURE — 3051F PR MOST RECENT HEMOGLOBIN A1C LEVEL 7.0 - < 8.0%: ICD-10-PCS | Mod: CPTII,S$GLB,, | Performed by: NURSE PRACTITIONER

## 2021-06-23 PROCEDURE — 1159F PR MEDICATION LIST DOCUMENTED IN MEDICAL RECORD: ICD-10-PCS | Mod: S$GLB,,, | Performed by: NURSE PRACTITIONER

## 2021-06-23 PROCEDURE — 99999 PR PBB SHADOW E&M-EST. PATIENT-LVL IV: ICD-10-PCS | Mod: PBBFAC,,, | Performed by: NURSE PRACTITIONER

## 2021-06-23 PROCEDURE — 1159F MED LIST DOCD IN RCRD: CPT | Mod: S$GLB,,, | Performed by: NURSE PRACTITIONER

## 2021-06-23 PROCEDURE — 1126F AMNT PAIN NOTED NONE PRSNT: CPT | Mod: S$GLB,,, | Performed by: NURSE PRACTITIONER

## 2021-06-23 PROCEDURE — 3051F HG A1C>EQUAL 7.0%<8.0%: CPT | Mod: CPTII,S$GLB,, | Performed by: NURSE PRACTITIONER

## 2021-06-23 PROCEDURE — 99999 PR PBB SHADOW E&M-EST. PATIENT-LVL IV: CPT | Mod: PBBFAC,,, | Performed by: NURSE PRACTITIONER

## 2021-06-23 PROCEDURE — 99214 PR OFFICE/OUTPT VISIT, EST, LEVL IV, 30-39 MIN: ICD-10-PCS | Mod: S$GLB,,, | Performed by: NURSE PRACTITIONER

## 2021-06-23 PROCEDURE — 1126F PR PAIN SEVERITY QUANTIFIED, NO PAIN PRESENT: ICD-10-PCS | Mod: S$GLB,,, | Performed by: NURSE PRACTITIONER

## 2021-06-23 RX ORDER — GLIPIZIDE 10 MG/1
10 TABLET ORAL
Qty: 180 TABLET | Refills: 3 | Status: SHIPPED | OUTPATIENT
Start: 2021-06-23 | End: 2022-02-25

## 2021-06-23 RX ORDER — PIOGLITAZONEHYDROCHLORIDE 15 MG/1
15 TABLET ORAL DAILY
Qty: 90 TABLET | Refills: 3 | Status: SHIPPED | OUTPATIENT
Start: 2021-06-23 | End: 2021-09-17

## 2021-09-10 ENCOUNTER — LAB VISIT (OUTPATIENT)
Dept: LAB | Facility: HOSPITAL | Age: 76
End: 2021-09-10
Attending: NURSE PRACTITIONER
Payer: MEDICARE

## 2021-09-10 DIAGNOSIS — E11.22 TYPE 2 DIABETES MELLITUS WITH STAGE 3 CHRONIC KIDNEY DISEASE, WITHOUT LONG-TERM CURRENT USE OF INSULIN, UNSPECIFIED WHETHER STAGE 3A OR 3B CKD: ICD-10-CM

## 2021-09-10 DIAGNOSIS — N18.30 TYPE 2 DIABETES MELLITUS WITH STAGE 3 CHRONIC KIDNEY DISEASE, WITHOUT LONG-TERM CURRENT USE OF INSULIN, UNSPECIFIED WHETHER STAGE 3A OR 3B CKD: ICD-10-CM

## 2021-09-10 LAB
ALBUMIN SERPL BCP-MCNC: 4.3 G/DL (ref 3.5–5.2)
ALP SERPL-CCNC: 76 U/L (ref 55–135)
ALT SERPL W/O P-5'-P-CCNC: 10 U/L (ref 10–44)
ANION GAP SERPL CALC-SCNC: 10 MMOL/L (ref 8–16)
AST SERPL-CCNC: 23 U/L (ref 10–40)
BILIRUB SERPL-MCNC: 0.8 MG/DL (ref 0.1–1)
BUN SERPL-MCNC: 26 MG/DL (ref 8–23)
CALCIUM SERPL-MCNC: 10 MG/DL (ref 8.7–10.5)
CHLORIDE SERPL-SCNC: 100 MMOL/L (ref 95–110)
CHOLEST SERPL-MCNC: 126 MG/DL (ref 120–199)
CHOLEST/HDLC SERPL: 2.7 {RATIO} (ref 2–5)
CO2 SERPL-SCNC: 26 MMOL/L (ref 23–29)
CREAT SERPL-MCNC: 1.4 MG/DL (ref 0.5–1.4)
EST. GFR  (AFRICAN AMERICAN): 55.6 ML/MIN/1.73 M^2
EST. GFR  (NON AFRICAN AMERICAN): 48.1 ML/MIN/1.73 M^2
ESTIMATED AVG GLUCOSE: 163 MG/DL (ref 68–131)
GLUCOSE SERPL-MCNC: 143 MG/DL (ref 70–110)
HBA1C MFR BLD: 7.3 % (ref 4–5.6)
HDLC SERPL-MCNC: 47 MG/DL (ref 40–75)
HDLC SERPL: 37.3 % (ref 20–50)
LDLC SERPL CALC-MCNC: 63.8 MG/DL (ref 63–159)
NONHDLC SERPL-MCNC: 79 MG/DL
POTASSIUM SERPL-SCNC: 5.5 MMOL/L (ref 3.5–5.1)
PROT SERPL-MCNC: 7.5 G/DL (ref 6–8.4)
SODIUM SERPL-SCNC: 136 MMOL/L (ref 136–145)
TRIGL SERPL-MCNC: 76 MG/DL (ref 30–150)

## 2021-09-10 PROCEDURE — 36415 COLL VENOUS BLD VENIPUNCTURE: CPT | Mod: PO | Performed by: NURSE PRACTITIONER

## 2021-09-10 PROCEDURE — 80053 COMPREHEN METABOLIC PANEL: CPT | Performed by: NURSE PRACTITIONER

## 2021-09-10 PROCEDURE — 83036 HEMOGLOBIN GLYCOSYLATED A1C: CPT | Performed by: NURSE PRACTITIONER

## 2021-09-10 PROCEDURE — 80061 LIPID PANEL: CPT | Performed by: NURSE PRACTITIONER

## 2021-09-17 ENCOUNTER — OFFICE VISIT (OUTPATIENT)
Dept: ENDOCRINOLOGY | Facility: CLINIC | Age: 76
End: 2021-09-17
Payer: MEDICARE

## 2021-09-17 VITALS
SYSTOLIC BLOOD PRESSURE: 108 MMHG | HEART RATE: 80 BPM | RESPIRATION RATE: 18 BRPM | HEIGHT: 72 IN | WEIGHT: 189.63 LBS | DIASTOLIC BLOOD PRESSURE: 76 MMHG | BODY MASS INDEX: 25.68 KG/M2

## 2021-09-17 DIAGNOSIS — E11.22 TYPE 2 DIABETES MELLITUS WITH STAGE 3 CHRONIC KIDNEY DISEASE, WITHOUT LONG-TERM CURRENT USE OF INSULIN, UNSPECIFIED WHETHER STAGE 3A OR 3B CKD: Primary | ICD-10-CM

## 2021-09-17 DIAGNOSIS — N18.30 TYPE 2 DIABETES MELLITUS WITH STAGE 3 CHRONIC KIDNEY DISEASE, WITHOUT LONG-TERM CURRENT USE OF INSULIN, UNSPECIFIED WHETHER STAGE 3A OR 3B CKD: Primary | ICD-10-CM

## 2021-09-17 DIAGNOSIS — E78.2 MIXED HYPERLIPIDEMIA: ICD-10-CM

## 2021-09-17 DIAGNOSIS — E11.59 TYPE 2 DIABETES MELLITUS WITH OTHER CIRCULATORY COMPLICATION, WITHOUT LONG-TERM CURRENT USE OF INSULIN: ICD-10-CM

## 2021-09-17 DIAGNOSIS — I25.10 CORONARY ARTERY DISEASE INVOLVING NATIVE CORONARY ARTERY OF NATIVE HEART WITHOUT ANGINA PECTORIS: ICD-10-CM

## 2021-09-17 PROCEDURE — 1101F PT FALLS ASSESS-DOCD LE1/YR: CPT | Mod: CPTII,S$GLB,, | Performed by: NURSE PRACTITIONER

## 2021-09-17 PROCEDURE — 1160F RVW MEDS BY RX/DR IN RCRD: CPT | Mod: CPTII,S$GLB,, | Performed by: NURSE PRACTITIONER

## 2021-09-17 PROCEDURE — 3288F FALL RISK ASSESSMENT DOCD: CPT | Mod: CPTII,S$GLB,, | Performed by: NURSE PRACTITIONER

## 2021-09-17 PROCEDURE — 1159F PR MEDICATION LIST DOCUMENTED IN MEDICAL RECORD: ICD-10-PCS | Mod: CPTII,S$GLB,, | Performed by: NURSE PRACTITIONER

## 2021-09-17 PROCEDURE — 1126F AMNT PAIN NOTED NONE PRSNT: CPT | Mod: CPTII,S$GLB,, | Performed by: NURSE PRACTITIONER

## 2021-09-17 PROCEDURE — 99214 PR OFFICE/OUTPT VISIT, EST, LEVL IV, 30-39 MIN: ICD-10-PCS | Mod: S$GLB,,, | Performed by: NURSE PRACTITIONER

## 2021-09-17 PROCEDURE — 3078F PR MOST RECENT DIASTOLIC BLOOD PRESSURE < 80 MM HG: ICD-10-PCS | Mod: CPTII,S$GLB,, | Performed by: NURSE PRACTITIONER

## 2021-09-17 PROCEDURE — 1101F PR PT FALLS ASSESS DOC 0-1 FALLS W/OUT INJ PAST YR: ICD-10-PCS | Mod: CPTII,S$GLB,, | Performed by: NURSE PRACTITIONER

## 2021-09-17 PROCEDURE — 1160F PR REVIEW ALL MEDS BY PRESCRIBER/CLIN PHARMACIST DOCUMENTED: ICD-10-PCS | Mod: CPTII,S$GLB,, | Performed by: NURSE PRACTITIONER

## 2021-09-17 PROCEDURE — 3051F PR MOST RECENT HEMOGLOBIN A1C LEVEL 7.0 - < 8.0%: ICD-10-PCS | Mod: CPTII,S$GLB,, | Performed by: NURSE PRACTITIONER

## 2021-09-17 PROCEDURE — 3078F DIAST BP <80 MM HG: CPT | Mod: CPTII,S$GLB,, | Performed by: NURSE PRACTITIONER

## 2021-09-17 PROCEDURE — 99999 PR PBB SHADOW E&M-EST. PATIENT-LVL IV: ICD-10-PCS | Mod: PBBFAC,,, | Performed by: NURSE PRACTITIONER

## 2021-09-17 PROCEDURE — 3074F PR MOST RECENT SYSTOLIC BLOOD PRESSURE < 130 MM HG: ICD-10-PCS | Mod: CPTII,S$GLB,, | Performed by: NURSE PRACTITIONER

## 2021-09-17 PROCEDURE — 3074F SYST BP LT 130 MM HG: CPT | Mod: CPTII,S$GLB,, | Performed by: NURSE PRACTITIONER

## 2021-09-17 PROCEDURE — 99214 OFFICE O/P EST MOD 30 MIN: CPT | Mod: S$GLB,,, | Performed by: NURSE PRACTITIONER

## 2021-09-17 PROCEDURE — 3288F PR FALLS RISK ASSESSMENT DOCUMENTED: ICD-10-PCS | Mod: CPTII,S$GLB,, | Performed by: NURSE PRACTITIONER

## 2021-09-17 PROCEDURE — 3051F HG A1C>EQUAL 7.0%<8.0%: CPT | Mod: CPTII,S$GLB,, | Performed by: NURSE PRACTITIONER

## 2021-09-17 PROCEDURE — 1159F MED LIST DOCD IN RCRD: CPT | Mod: CPTII,S$GLB,, | Performed by: NURSE PRACTITIONER

## 2021-09-17 PROCEDURE — 99999 PR PBB SHADOW E&M-EST. PATIENT-LVL IV: CPT | Mod: PBBFAC,,, | Performed by: NURSE PRACTITIONER

## 2021-09-17 PROCEDURE — 1126F PR PAIN SEVERITY QUANTIFIED, NO PAIN PRESENT: ICD-10-PCS | Mod: CPTII,S$GLB,, | Performed by: NURSE PRACTITIONER

## 2022-01-14 ENCOUNTER — LAB VISIT (OUTPATIENT)
Dept: LAB | Facility: HOSPITAL | Age: 77
End: 2022-01-14
Attending: NURSE PRACTITIONER
Payer: MEDICARE

## 2022-01-14 DIAGNOSIS — N18.30 TYPE 2 DIABETES MELLITUS WITH STAGE 3 CHRONIC KIDNEY DISEASE, WITHOUT LONG-TERM CURRENT USE OF INSULIN, UNSPECIFIED WHETHER STAGE 3A OR 3B CKD: ICD-10-CM

## 2022-01-14 DIAGNOSIS — E11.22 TYPE 2 DIABETES MELLITUS WITH STAGE 3 CHRONIC KIDNEY DISEASE, WITHOUT LONG-TERM CURRENT USE OF INSULIN, UNSPECIFIED WHETHER STAGE 3A OR 3B CKD: ICD-10-CM

## 2022-01-14 LAB
ANION GAP SERPL CALC-SCNC: 11 MMOL/L (ref 8–16)
BUN SERPL-MCNC: 19 MG/DL (ref 8–23)
CALCIUM SERPL-MCNC: 9.5 MG/DL (ref 8.7–10.5)
CHLORIDE SERPL-SCNC: 90 MMOL/L (ref 95–110)
CO2 SERPL-SCNC: 23 MMOL/L (ref 23–29)
CREAT SERPL-MCNC: 1.4 MG/DL (ref 0.5–1.4)
EST. GFR  (AFRICAN AMERICAN): 55.2 ML/MIN/1.73 M^2
EST. GFR  (NON AFRICAN AMERICAN): 47.8 ML/MIN/1.73 M^2
ESTIMATED AVG GLUCOSE: 171 MG/DL (ref 68–131)
GLUCOSE SERPL-MCNC: 141 MG/DL (ref 70–110)
HBA1C MFR BLD: 7.6 % (ref 4–5.6)
POTASSIUM SERPL-SCNC: 5.4 MMOL/L (ref 3.5–5.1)
SODIUM SERPL-SCNC: 124 MMOL/L (ref 136–145)

## 2022-01-14 PROCEDURE — 36415 COLL VENOUS BLD VENIPUNCTURE: CPT | Mod: PO | Performed by: NURSE PRACTITIONER

## 2022-01-14 PROCEDURE — 83036 HEMOGLOBIN GLYCOSYLATED A1C: CPT | Performed by: NURSE PRACTITIONER

## 2022-01-14 PROCEDURE — 80048 BASIC METABOLIC PNL TOTAL CA: CPT | Performed by: NURSE PRACTITIONER

## 2022-02-25 ENCOUNTER — OFFICE VISIT (OUTPATIENT)
Dept: ENDOCRINOLOGY | Facility: CLINIC | Age: 77
End: 2022-02-25
Payer: MEDICARE

## 2022-02-25 VITALS
SYSTOLIC BLOOD PRESSURE: 170 MMHG | BODY MASS INDEX: 26.51 KG/M2 | DIASTOLIC BLOOD PRESSURE: 90 MMHG | HEIGHT: 72 IN | HEART RATE: 77 BPM | WEIGHT: 195.75 LBS

## 2022-02-25 DIAGNOSIS — I25.10 CORONARY ARTERY DISEASE INVOLVING NATIVE CORONARY ARTERY OF NATIVE HEART WITHOUT ANGINA PECTORIS: ICD-10-CM

## 2022-02-25 DIAGNOSIS — E87.1 HYPONATREMIA: ICD-10-CM

## 2022-02-25 DIAGNOSIS — E11.22 TYPE 2 DIABETES MELLITUS WITH STAGE 3 CHRONIC KIDNEY DISEASE, WITHOUT LONG-TERM CURRENT USE OF INSULIN, UNSPECIFIED WHETHER STAGE 3A OR 3B CKD: Primary | ICD-10-CM

## 2022-02-25 DIAGNOSIS — N18.30 TYPE 2 DIABETES MELLITUS WITH STAGE 3 CHRONIC KIDNEY DISEASE, WITHOUT LONG-TERM CURRENT USE OF INSULIN, UNSPECIFIED WHETHER STAGE 3A OR 3B CKD: Primary | ICD-10-CM

## 2022-02-25 DIAGNOSIS — E78.2 MIXED HYPERLIPIDEMIA: ICD-10-CM

## 2022-02-25 DIAGNOSIS — E87.5 HYPERKALEMIA: ICD-10-CM

## 2022-02-25 PROCEDURE — 1160F PR REVIEW ALL MEDS BY PRESCRIBER/CLIN PHARMACIST DOCUMENTED: ICD-10-PCS | Mod: CPTII,S$GLB,, | Performed by: NURSE PRACTITIONER

## 2022-02-25 PROCEDURE — 1159F MED LIST DOCD IN RCRD: CPT | Mod: CPTII,S$GLB,, | Performed by: NURSE PRACTITIONER

## 2022-02-25 PROCEDURE — 1101F PR PT FALLS ASSESS DOC 0-1 FALLS W/OUT INJ PAST YR: ICD-10-PCS | Mod: CPTII,S$GLB,, | Performed by: NURSE PRACTITIONER

## 2022-02-25 PROCEDURE — 1159F PR MEDICATION LIST DOCUMENTED IN MEDICAL RECORD: ICD-10-PCS | Mod: CPTII,S$GLB,, | Performed by: NURSE PRACTITIONER

## 2022-02-25 PROCEDURE — 1101F PT FALLS ASSESS-DOCD LE1/YR: CPT | Mod: CPTII,S$GLB,, | Performed by: NURSE PRACTITIONER

## 2022-02-25 PROCEDURE — 99999 PR PBB SHADOW E&M-EST. PATIENT-LVL III: CPT | Mod: PBBFAC,,, | Performed by: NURSE PRACTITIONER

## 2022-02-25 PROCEDURE — 99214 PR OFFICE/OUTPT VISIT, EST, LEVL IV, 30-39 MIN: ICD-10-PCS | Mod: S$GLB,,, | Performed by: NURSE PRACTITIONER

## 2022-02-25 PROCEDURE — 1126F AMNT PAIN NOTED NONE PRSNT: CPT | Mod: CPTII,S$GLB,, | Performed by: NURSE PRACTITIONER

## 2022-02-25 PROCEDURE — 3080F DIAST BP >= 90 MM HG: CPT | Mod: CPTII,S$GLB,, | Performed by: NURSE PRACTITIONER

## 2022-02-25 PROCEDURE — 3077F SYST BP >= 140 MM HG: CPT | Mod: CPTII,S$GLB,, | Performed by: NURSE PRACTITIONER

## 2022-02-25 PROCEDURE — 3051F PR MOST RECENT HEMOGLOBIN A1C LEVEL 7.0 - < 8.0%: ICD-10-PCS | Mod: CPTII,S$GLB,, | Performed by: NURSE PRACTITIONER

## 2022-02-25 PROCEDURE — 99214 OFFICE O/P EST MOD 30 MIN: CPT | Mod: S$GLB,,, | Performed by: NURSE PRACTITIONER

## 2022-02-25 PROCEDURE — 3051F HG A1C>EQUAL 7.0%<8.0%: CPT | Mod: CPTII,S$GLB,, | Performed by: NURSE PRACTITIONER

## 2022-02-25 PROCEDURE — 1160F RVW MEDS BY RX/DR IN RCRD: CPT | Mod: CPTII,S$GLB,, | Performed by: NURSE PRACTITIONER

## 2022-02-25 PROCEDURE — 3288F PR FALLS RISK ASSESSMENT DOCUMENTED: ICD-10-PCS | Mod: CPTII,S$GLB,, | Performed by: NURSE PRACTITIONER

## 2022-02-25 PROCEDURE — 3288F FALL RISK ASSESSMENT DOCD: CPT | Mod: CPTII,S$GLB,, | Performed by: NURSE PRACTITIONER

## 2022-02-25 PROCEDURE — 1126F PR PAIN SEVERITY QUANTIFIED, NO PAIN PRESENT: ICD-10-PCS | Mod: CPTII,S$GLB,, | Performed by: NURSE PRACTITIONER

## 2022-02-25 PROCEDURE — 99999 PR PBB SHADOW E&M-EST. PATIENT-LVL III: ICD-10-PCS | Mod: PBBFAC,,, | Performed by: NURSE PRACTITIONER

## 2022-02-25 PROCEDURE — 3080F PR MOST RECENT DIASTOLIC BLOOD PRESSURE >= 90 MM HG: ICD-10-PCS | Mod: CPTII,S$GLB,, | Performed by: NURSE PRACTITIONER

## 2022-02-25 PROCEDURE — 3077F PR MOST RECENT SYSTOLIC BLOOD PRESSURE >= 140 MM HG: ICD-10-PCS | Mod: CPTII,S$GLB,, | Performed by: NURSE PRACTITIONER

## 2022-02-25 RX ORDER — GLIPIZIDE 5 MG/1
5 TABLET ORAL 2 TIMES DAILY WITH MEALS
Qty: 180 TABLET | Refills: 3 | Status: SHIPPED | OUTPATIENT
Start: 2022-02-25 | End: 2022-05-23

## 2022-02-25 RX ORDER — VALSARTAN 160 MG/1
160 TABLET ORAL DAILY
COMMUNITY
Start: 2021-12-03

## 2022-02-25 RX ORDER — NITROGLYCERIN 0.4 MG/1
TABLET SUBLINGUAL
COMMUNITY
Start: 2021-10-07

## 2022-02-25 RX ORDER — NEBIVOLOL 5 MG/1
5 TABLET ORAL
COMMUNITY
Start: 2022-01-13

## 2022-02-25 NOTE — PROGRESS NOTES
CC: Mr. Felix Sheridan arrives today for management of Type 2 DM and review of chronic medical conditions, as listed in the visit diagnosis section of this encounter.     HPI: Mr. Felix Sheridan was diagnosed with Type 2 DM in . He was diagnosed based on lab work. Initial treatment consisted of metformin. This was later stopped, due to renal function. + FH of DM in mother, brothers. Denies hospitalizations due to DM.   History of renal cell carcinoma status post partial nephrectomy years ago. He follows with nephrology (Dr. Cowan).  Patient has CAD with multiple past PCI. His cardiologist is Dr. Dykes.     Patient was last seen by me in September.    He saw cardiologist yesterday and had metabolic panel done. He states that he was told that kidney function worsened but was not told that electrolytes were abnormal.     Continues administering glipizide as needed.     BG readings are checked 2x/day. No logs to clinic. Reports the following:  Fastin  Bedtime:     Hypoglycemia: Rare - recalls one episode  Hypoglycemic Symptoms: nausea  Hypoglycemia Treatment: Coke    Missing Insulin/PO medication doses: No    Walks 3 miles per days/week.     Dietary Habits: Eats 3 meals/day.  May snack on PB on SF bread.  Avoids sugary beverages.       CURRENT DIABETIC MEDS: glipizide 5-10 mg if BG >150 (sometimes taking 5 mg BID)  Glucometer type: Accucheck Guide    Previous DM treatments:  Metformin - discontinued due to renal function  Jardiance - discontinued due to renal function  Tradjenta - cost  Pioglitazone - BLE swelling    Last Eye Exam: , Dr. Prado. No DR  Last Podiatry Exam: n/a    REVIEW OF SYSTEMS  Constitutional: no c/o fatigue, weakness, or weight loss. + 6# weight gain  Eyes: denies visual disturbances.  Cardiac: no palpitations or chest pain.  Respiratory: no cough or dyspnea.  GI: no c/o abdominal pain or nausea  Skin: no lesions or rashes.   Neuro: no numbness, tingling, or  parasthesias.  Endocrine: denies polyphagia, polydipsia, polyuria      Personally reviewed Past Medical, Surgical, Social History.    Vital Signs  BP (!) 170/90   Pulse 77   Ht 6' (1.829 m)   Wt 88.8 kg (195 lb 12.3 oz)   BMI 26.55 kg/m²     Personally reviewed the below labs:    Hemoglobin A1C   Date Value Ref Range Status   01/14/2022 7.6 (H) 4.0 - 5.6 % Final     Comment:     ADA Screening Guidelines:  5.7-6.4%  Consistent with prediabetes  >or=6.5%  Consistent with diabetes    High levels of fetal hemoglobin interfere with the HbA1C  assay. Heterozygous hemoglobin variants (HbS, HgC, etc)do  not significantly interfere with this assay.   However, presence of multiple variants may affect accuracy.     09/10/2021 7.3 (H) 4.0 - 5.6 % Final     Comment:     ADA Screening Guidelines:  5.7-6.4%  Consistent with prediabetes  >or=6.5%  Consistent with diabetes    High levels of fetal hemoglobin interfere with the HbA1C  assay. Heterozygous hemoglobin variants (HbS, HgC, etc)do  not significantly interfere with this assay.   However, presence of multiple variants may affect accuracy.     06/12/2021 7.2 (H) 4.0 - 5.6 % Final     Comment:     ADA Screening Guidelines:  5.7-6.4%  Consistent with prediabetes  >or=6.5%  Consistent with diabetes    High levels of fetal hemoglobin interfere with the HbA1C  assay. Heterozygous hemoglobin variants (HbS, HgC, etc)do  not significantly interfere with this assay.   However, presence of multiple variants may affect accuracy.         Chemistry        Component Value Date/Time     (L) 01/14/2022 0955    K 5.4 (H) 01/14/2022 0955    CL 90 (L) 01/14/2022 0955    CO2 23 01/14/2022 0955    BUN 19 01/14/2022 0955    CREATININE 1.4 01/14/2022 0955     (H) 01/14/2022 0955        Component Value Date/Time    CALCIUM 9.5 01/14/2022 0955    ALKPHOS 76 09/10/2021 0854    AST 23 09/10/2021 0854    ALT 10 09/10/2021 0854    BILITOT 0.8 09/10/2021 0854    TRICIA 55.2 (A)  01/14/2022 0955    EGFRNONAA 47.8 (A) 01/14/2022 0955          Lab Results   Component Value Date    CHOL 126 09/10/2021     Lab Results   Component Value Date    HDL 47 09/10/2021     Lab Results   Component Value Date    LDLCALC 63.8 09/10/2021     Lab Results   Component Value Date    TRIG 76 09/10/2021     Lab Results   Component Value Date    CHOLHDL 37.3 09/10/2021       Lab Results   Component Value Date    MICALBCREAT 22.1 11/06/2020     No results found for: TSH    CrCl cannot be calculated (Patient's most recent lab result is older than the maximum 7 days allowed.).    No results found for: [id number]      PHYSICAL EXAMINATION  Constitutional: Appears well, no distress  Neck: Supple, trachea midline.  Respiratory: CTA, even and unlabored.  Cardiovascular: RRR, no murmurs, no carotid bruits.  GI: active bowel sounds, no hernia noted.  Skin: warm and dry  Neuro: oriented to person, place, time  Feet: appropriate footwear.       Goals      HEMOGLOBIN A1C < 8             Assessment/Plan  1. Type 2 diabetes mellitus with stage 3 chronic kidney disease, without long-term current use of insulin, unspecified whether stage 3a or 3b CKD  -- A1c has increased. Cannot use certain oral agents due to renal function and cost is also an issue.   -- recommended taking glipizide 5 mg twice daily with meals. To stop using as needed.   -- BG monitoring 1x/day, alternating times. Notify me if BG are consistently >150    -- Reviewed hypoglycemia management: treat with 4 oz of juice, 4 oz regular soda, or 4 glucose tablets. Monitor and repeat treatment every 15 minutes until BG is >70 Then have a snack, which includes 15 grams of complex carbohydrates and protein.   Advised patient to check BG before activities, such as driving or exercise.  -- takes statin, ARB, aspirin   2. Coronary artery disease involving native coronary artery of native heart without angina pectoris  -- stable  -- follows with cardiology   3. Mixed  hyperlipidemia  -- controlled  -- continue pravastatin   4. Hyponatremia  -- has since had updated lab work  -- metabolic panel was faxed to nephrology yesterday from cardiologist's office   5. Hyperkalemia  -- see A/P #1  -- follow up with nephrology.          FOLLOW UP  Follow up in about 3 months (around 5/25/2022).   Patient instructed to bring BG logs to each follow up   Patient encouraged to call for any BG/medication issues, concerns, or questions.    Orders Placed This Encounter   Procedures    Hemoglobin A1C    Comprehensive Metabolic Panel

## 2022-05-16 ENCOUNTER — LAB VISIT (OUTPATIENT)
Dept: LAB | Facility: HOSPITAL | Age: 77
End: 2022-05-16
Attending: NURSE PRACTITIONER
Payer: MEDICARE

## 2022-05-16 DIAGNOSIS — N18.30 TYPE 2 DIABETES MELLITUS WITH STAGE 3 CHRONIC KIDNEY DISEASE, WITHOUT LONG-TERM CURRENT USE OF INSULIN, UNSPECIFIED WHETHER STAGE 3A OR 3B CKD: ICD-10-CM

## 2022-05-16 DIAGNOSIS — E11.22 TYPE 2 DIABETES MELLITUS WITH STAGE 3 CHRONIC KIDNEY DISEASE, WITHOUT LONG-TERM CURRENT USE OF INSULIN, UNSPECIFIED WHETHER STAGE 3A OR 3B CKD: ICD-10-CM

## 2022-05-16 LAB
ALBUMIN SERPL BCP-MCNC: 3.8 G/DL (ref 3.5–5.2)
ALP SERPL-CCNC: 74 U/L (ref 55–135)
ALT SERPL W/O P-5'-P-CCNC: 13 U/L (ref 10–44)
ANION GAP SERPL CALC-SCNC: 10 MMOL/L (ref 8–16)
AST SERPL-CCNC: 57 U/L (ref 10–40)
BILIRUB SERPL-MCNC: 0.9 MG/DL (ref 0.1–1)
BUN SERPL-MCNC: 17 MG/DL (ref 8–23)
CALCIUM SERPL-MCNC: 9.8 MG/DL (ref 8.7–10.5)
CHLORIDE SERPL-SCNC: 96 MMOL/L (ref 95–110)
CO2 SERPL-SCNC: 21 MMOL/L (ref 23–29)
CREAT SERPL-MCNC: 1.5 MG/DL (ref 0.5–1.4)
EST. GFR  (AFRICAN AMERICAN): 50.8 ML/MIN/1.73 M^2
EST. GFR  (NON AFRICAN AMERICAN): 44 ML/MIN/1.73 M^2
ESTIMATED AVG GLUCOSE: 157 MG/DL (ref 68–131)
GLUCOSE SERPL-MCNC: 134 MG/DL (ref 70–110)
HBA1C MFR BLD: 7.1 % (ref 4–5.6)
POTASSIUM SERPL-SCNC: 5.3 MMOL/L (ref 3.5–5.1)
PROT SERPL-MCNC: 7.5 G/DL (ref 6–8.4)
SODIUM SERPL-SCNC: 127 MMOL/L (ref 136–145)

## 2022-05-16 PROCEDURE — 83036 HEMOGLOBIN GLYCOSYLATED A1C: CPT | Performed by: NURSE PRACTITIONER

## 2022-05-16 PROCEDURE — 80053 COMPREHEN METABOLIC PANEL: CPT | Performed by: NURSE PRACTITIONER

## 2022-05-16 PROCEDURE — 36415 COLL VENOUS BLD VENIPUNCTURE: CPT | Mod: PO | Performed by: NURSE PRACTITIONER

## 2022-05-18 ENCOUNTER — TELEPHONE (OUTPATIENT)
Dept: ENDOCRINOLOGY | Facility: CLINIC | Age: 77
End: 2022-05-18
Payer: MEDICARE

## 2022-05-23 ENCOUNTER — OFFICE VISIT (OUTPATIENT)
Dept: ENDOCRINOLOGY | Facility: CLINIC | Age: 77
End: 2022-05-23
Payer: MEDICARE

## 2022-05-23 VITALS
BODY MASS INDEX: 26.02 KG/M2 | HEART RATE: 56 BPM | HEIGHT: 72 IN | DIASTOLIC BLOOD PRESSURE: 80 MMHG | WEIGHT: 192.13 LBS | SYSTOLIC BLOOD PRESSURE: 130 MMHG

## 2022-05-23 DIAGNOSIS — E87.1 HYPONATREMIA: ICD-10-CM

## 2022-05-23 DIAGNOSIS — E11.22 TYPE 2 DIABETES MELLITUS WITH STAGE 3 CHRONIC KIDNEY DISEASE, WITHOUT LONG-TERM CURRENT USE OF INSULIN, UNSPECIFIED WHETHER STAGE 3A OR 3B CKD: Primary | ICD-10-CM

## 2022-05-23 DIAGNOSIS — I25.10 CORONARY ARTERY DISEASE INVOLVING NATIVE CORONARY ARTERY OF NATIVE HEART WITHOUT ANGINA PECTORIS: ICD-10-CM

## 2022-05-23 DIAGNOSIS — N18.30 TYPE 2 DIABETES MELLITUS WITH STAGE 3 CHRONIC KIDNEY DISEASE, WITHOUT LONG-TERM CURRENT USE OF INSULIN, UNSPECIFIED WHETHER STAGE 3A OR 3B CKD: Primary | ICD-10-CM

## 2022-05-23 DIAGNOSIS — R74.01 ELEVATED AST (SGOT): ICD-10-CM

## 2022-05-23 DIAGNOSIS — E78.2 MIXED HYPERLIPIDEMIA: ICD-10-CM

## 2022-05-23 PROCEDURE — 3079F DIAST BP 80-89 MM HG: CPT | Mod: CPTII,S$GLB,, | Performed by: NURSE PRACTITIONER

## 2022-05-23 PROCEDURE — 3075F PR MOST RECENT SYSTOLIC BLOOD PRESS GE 130-139MM HG: ICD-10-PCS | Mod: CPTII,S$GLB,, | Performed by: NURSE PRACTITIONER

## 2022-05-23 PROCEDURE — 3051F PR MOST RECENT HEMOGLOBIN A1C LEVEL 7.0 - < 8.0%: ICD-10-PCS | Mod: CPTII,S$GLB,, | Performed by: NURSE PRACTITIONER

## 2022-05-23 PROCEDURE — 3079F PR MOST RECENT DIASTOLIC BLOOD PRESSURE 80-89 MM HG: ICD-10-PCS | Mod: CPTII,S$GLB,, | Performed by: NURSE PRACTITIONER

## 2022-05-23 PROCEDURE — 1159F MED LIST DOCD IN RCRD: CPT | Mod: CPTII,S$GLB,, | Performed by: NURSE PRACTITIONER

## 2022-05-23 PROCEDURE — 1160F PR REVIEW ALL MEDS BY PRESCRIBER/CLIN PHARMACIST DOCUMENTED: ICD-10-PCS | Mod: CPTII,S$GLB,, | Performed by: NURSE PRACTITIONER

## 2022-05-23 PROCEDURE — 3288F PR FALLS RISK ASSESSMENT DOCUMENTED: ICD-10-PCS | Mod: CPTII,S$GLB,, | Performed by: NURSE PRACTITIONER

## 2022-05-23 PROCEDURE — 99214 PR OFFICE/OUTPT VISIT, EST, LEVL IV, 30-39 MIN: ICD-10-PCS | Mod: S$GLB,,, | Performed by: NURSE PRACTITIONER

## 2022-05-23 PROCEDURE — 99999 PR PBB SHADOW E&M-EST. PATIENT-LVL III: CPT | Mod: PBBFAC,,, | Performed by: NURSE PRACTITIONER

## 2022-05-23 PROCEDURE — 1126F PR PAIN SEVERITY QUANTIFIED, NO PAIN PRESENT: ICD-10-PCS | Mod: CPTII,S$GLB,, | Performed by: NURSE PRACTITIONER

## 2022-05-23 PROCEDURE — 99999 PR PBB SHADOW E&M-EST. PATIENT-LVL III: ICD-10-PCS | Mod: PBBFAC,,, | Performed by: NURSE PRACTITIONER

## 2022-05-23 PROCEDURE — 3051F HG A1C>EQUAL 7.0%<8.0%: CPT | Mod: CPTII,S$GLB,, | Performed by: NURSE PRACTITIONER

## 2022-05-23 PROCEDURE — 1126F AMNT PAIN NOTED NONE PRSNT: CPT | Mod: CPTII,S$GLB,, | Performed by: NURSE PRACTITIONER

## 2022-05-23 PROCEDURE — 1160F RVW MEDS BY RX/DR IN RCRD: CPT | Mod: CPTII,S$GLB,, | Performed by: NURSE PRACTITIONER

## 2022-05-23 PROCEDURE — 1101F PR PT FALLS ASSESS DOC 0-1 FALLS W/OUT INJ PAST YR: ICD-10-PCS | Mod: CPTII,S$GLB,, | Performed by: NURSE PRACTITIONER

## 2022-05-23 PROCEDURE — 99214 OFFICE O/P EST MOD 30 MIN: CPT | Mod: S$GLB,,, | Performed by: NURSE PRACTITIONER

## 2022-05-23 PROCEDURE — 3288F FALL RISK ASSESSMENT DOCD: CPT | Mod: CPTII,S$GLB,, | Performed by: NURSE PRACTITIONER

## 2022-05-23 PROCEDURE — 1159F PR MEDICATION LIST DOCUMENTED IN MEDICAL RECORD: ICD-10-PCS | Mod: CPTII,S$GLB,, | Performed by: NURSE PRACTITIONER

## 2022-05-23 PROCEDURE — 3075F SYST BP GE 130 - 139MM HG: CPT | Mod: CPTII,S$GLB,, | Performed by: NURSE PRACTITIONER

## 2022-05-23 PROCEDURE — 1101F PT FALLS ASSESS-DOCD LE1/YR: CPT | Mod: CPTII,S$GLB,, | Performed by: NURSE PRACTITIONER

## 2022-05-23 RX ORDER — GLIMEPIRIDE 1 MG/1
1 TABLET ORAL
Qty: 30 TABLET | Refills: 6 | Status: SHIPPED | OUTPATIENT
Start: 2022-05-23 | End: 2022-06-14

## 2022-05-23 NOTE — PATIENT INSTRUCTIONS
Stop glipizide    Start glimepiride 1 mg before breakfast only. Do not take any diabetes medication at night.    If blood sugars are consistently > 150, let me know.

## 2022-05-23 NOTE — PROGRESS NOTES
"CC: Mr. Felix Sheridan arrives today for management of Type 2 DM and review of chronic medical conditions, as listed in the visit diagnosis section of this encounter.     HPI: Mr. Felix Sheridan was diagnosed with Type 2 DM in 1974. He was diagnosed based on lab work. Initial treatment consisted of metformin. This was later stopped, due to renal function. + FH of DM in mother, brothers. Denies hospitalizations due to DM.   History of renal cell carcinoma status post partial nephrectomy years ago. He follows with nephrology (Dr. Cowan).  Patient has CAD with multiple past PCI. His cardiologist is Dr. Dykes.     Patient was last seen by me in February.    He states that he drinks " a lot of water to keep his creatinine up." He has been referred to endocrinologist (Dr. Sandifer) for hyponatremia and has appt on 6/6. He is requesting to cancel this today because he has an appointment with his cardiologist on 6/8 and would prefer that he handle this.     He states that he doesn't take dinner glipizide dose if pre-meal glucose is normal. Other times, he gives 2 glipizide tablets at night if he thinks he needs it.     BG readings are checked 2x/day.                 Hypoglycemia: Rare - recalls one episode after drinking a few beers. He has recognized this pattern.   Hypoglycemic Symptoms: nausea  Hypoglycemia Treatment: Coke    Missing Insulin/PO medication doses: Yes, skips PM glipizide sometimes.     Walks 3 miles 5 days/week.     Dietary Habits: Eats 3 meals/day.  May snack on spoonful of PB.  Avoids sugary beverages.       CURRENT DIABETIC MEDS: glipizide 5-10 mg if BG >150 (usually 5 mg in AM and 1-10 mg in PM)  Glucometer type: Accucheck Guide    Previous DM treatments:  Metformin - discontinued due to renal function  Jardiance - discontinued due to renal function  Tradjenta - cost  Pioglitazone - BLE swelling    Last Eye Exam: 2020, Dr. Prado. No   Last Podiatry Exam: n/a    REVIEW OF SYSTEMS  Constitutional: " no c/o fatigue, weakness. + 3# weight loss.   Eyes: denies visual disturbances.  Cardiac: no palpitations or chest pain.  Respiratory: no cough or dyspnea.  GI: no c/o abdominal pain or nausea  Skin: no lesions or rashes.   Neuro: no numbness, tingling, or parasthesias.  Endocrine: denies polyphagia, polydipsia, polyuria      Personally reviewed Past Medical, Surgical, Social History.    Vital Signs  /80   Pulse (!) 56   Ht 6' (1.829 m)   Wt 87.1 kg (192 lb 2.1 oz)   BMI 26.06 kg/m²     Personally reviewed the below labs:    Hemoglobin A1C   Date Value Ref Range Status   05/16/2022 7.1 (H) 4.0 - 5.6 % Final     Comment:     ADA Screening Guidelines:  5.7-6.4%  Consistent with prediabetes  >or=6.5%  Consistent with diabetes    High levels of fetal hemoglobin interfere with the HbA1C  assay. Heterozygous hemoglobin variants (HbS, HgC, etc)do  not significantly interfere with this assay.   However, presence of multiple variants may affect accuracy.     01/14/2022 7.6 (H) 4.0 - 5.6 % Final     Comment:     ADA Screening Guidelines:  5.7-6.4%  Consistent with prediabetes  >or=6.5%  Consistent with diabetes    High levels of fetal hemoglobin interfere with the HbA1C  assay. Heterozygous hemoglobin variants (HbS, HgC, etc)do  not significantly interfere with this assay.   However, presence of multiple variants may affect accuracy.     09/10/2021 7.3 (H) 4.0 - 5.6 % Final     Comment:     ADA Screening Guidelines:  5.7-6.4%  Consistent with prediabetes  >or=6.5%  Consistent with diabetes    High levels of fetal hemoglobin interfere with the HbA1C  assay. Heterozygous hemoglobin variants (HbS, HgC, etc)do  not significantly interfere with this assay.   However, presence of multiple variants may affect accuracy.         Chemistry        Component Value Date/Time     (L) 05/16/2022 1031    K 5.3 (H) 05/16/2022 1031    CL 96 05/16/2022 1031    CO2 21 (L) 05/16/2022 1031    BUN 17 05/16/2022 1031    CREATININE  1.5 (H) 05/16/2022 1031     (H) 05/16/2022 1031        Component Value Date/Time    CALCIUM 9.8 05/16/2022 1031    ALKPHOS 74 05/16/2022 1031    AST 57 (H) 05/16/2022 1031    ALT 13 05/16/2022 1031    BILITOT 0.9 05/16/2022 1031    ESTGFRAFRICA 50.8 (A) 05/16/2022 1031    EGFRNONAA 44.0 (A) 05/16/2022 1031          Lab Results   Component Value Date    CHOL 126 09/10/2021     Lab Results   Component Value Date    HDL 47 09/10/2021     Lab Results   Component Value Date    LDLCALC 63.8 09/10/2021     Lab Results   Component Value Date    TRIG 76 09/10/2021     Lab Results   Component Value Date    CHOLHDL 37.3 09/10/2021       Lab Results   Component Value Date    MICALBCREAT 22.1 11/06/2020     No results found for: TSH    Estimated Creatinine Clearance: 44.5 mL/min (A) (based on SCr of 1.5 mg/dL (H)).    No results found for: [id number]      PHYSICAL EXAMINATION  Constitutional: Appears well, no distress  Neck: Supple, trachea midline.  Respiratory: CTA, even and unlabored.  Cardiovascular: RRR, no murmurs, no carotid bruits.  GI: active bowel sounds, no hernia noted.  Skin: warm and dry  Neuro: oriented to person, place, time  Feet: appropriate footwear.       Goals      HEMOGLOBIN A1C < 8             Assessment/Plan  1. Type 2 diabetes mellitus with stage 3 chronic kidney disease, without long-term current use of insulin, unspecified whether stage 3a or 3b CKD  -- A1c controlled. However, patient is dosing glipizide inconsistently in the evening.  Cannot use certain oral agents due to renal function and cost.   -- will trial daily glimepiride in place of BID glipizide.   -- stop glipizide  -- start glimepiride 1 mg before breakfast.   -- warned patient of the risk of hypoglycemia with use of MOSES meds. Explained that risk increases with ETOH use. Limit ETOH but if he does have a drink, at least have a snack with 15g CHO and a protein.    -- BG monitoring 1x/day, alternating times. Notify me if BG are  consistently >150 after starting glimepiride.     -- Reviewed hypoglycemia management: treat with 4 oz of juice, 4 oz regular soda, or 4 glucose tablets. Monitor and repeat treatment every 15 minutes until BG is >70 Then have a snack, which includes 15 grams of complex carbohydrates and protein.   Advised patient to check BG before activities, such as driving or exercise.  -- takes statin, ARB, aspirin   2. Coronary artery disease involving native coronary artery of native heart without angina pectoris  -- stable  -- follows with cardiology   3. Mixed hyperlipidemia  -- controlled  -- continue pravastatin   4. Hyponatremia  -- declined referral to endocrinologist  -- he will address with his cardiologist. Labs were printed and given to patient.   5. Elevated AST -- new finding.  -- advised patient to limit ETOH consumption  -- repeat with C         FOLLOW UP  Follow up in about 3 months (around 8/23/2022).   Patient instructed to bring BG logs to each follow up   Patient encouraged to call for any BG/medication issues, concerns, or questions.    Orders Placed This Encounter   Procedures    Hemoglobin A1C    Comprehensive Metabolic Panel    Microalbumin/Creatinine Ratio, Urine

## 2022-06-13 ENCOUNTER — PATIENT MESSAGE (OUTPATIENT)
Dept: ENDOCRINOLOGY | Facility: CLINIC | Age: 77
End: 2022-06-13
Payer: MEDICARE

## 2022-06-14 ENCOUNTER — PATIENT MESSAGE (OUTPATIENT)
Dept: ENDOCRINOLOGY | Facility: CLINIC | Age: 77
End: 2022-06-14
Payer: MEDICARE

## 2022-06-14 DIAGNOSIS — E11.22 TYPE 2 DIABETES MELLITUS WITH STAGE 3 CHRONIC KIDNEY DISEASE, WITHOUT LONG-TERM CURRENT USE OF INSULIN, UNSPECIFIED WHETHER STAGE 3A OR 3B CKD: ICD-10-CM

## 2022-06-14 DIAGNOSIS — N18.30 TYPE 2 DIABETES MELLITUS WITH STAGE 3 CHRONIC KIDNEY DISEASE, WITHOUT LONG-TERM CURRENT USE OF INSULIN, UNSPECIFIED WHETHER STAGE 3A OR 3B CKD: ICD-10-CM

## 2022-06-14 RX ORDER — GLIMEPIRIDE 2 MG/1
2 TABLET ORAL
Qty: 30 TABLET | Refills: 6 | Status: SHIPPED | OUTPATIENT
Start: 2022-06-14 | End: 2022-08-29

## 2022-08-22 ENCOUNTER — LAB VISIT (OUTPATIENT)
Dept: LAB | Facility: HOSPITAL | Age: 77
End: 2022-08-22
Attending: NURSE PRACTITIONER
Payer: MEDICARE

## 2022-08-22 DIAGNOSIS — N18.30 TYPE 2 DIABETES MELLITUS WITH STAGE 3 CHRONIC KIDNEY DISEASE, WITHOUT LONG-TERM CURRENT USE OF INSULIN, UNSPECIFIED WHETHER STAGE 3A OR 3B CKD: ICD-10-CM

## 2022-08-22 DIAGNOSIS — E11.22 TYPE 2 DIABETES MELLITUS WITH STAGE 3 CHRONIC KIDNEY DISEASE, WITHOUT LONG-TERM CURRENT USE OF INSULIN, UNSPECIFIED WHETHER STAGE 3A OR 3B CKD: ICD-10-CM

## 2022-08-22 LAB
ALBUMIN SERPL BCP-MCNC: 4.1 G/DL (ref 3.5–5.2)
ALP SERPL-CCNC: 71 U/L (ref 55–135)
ALT SERPL W/O P-5'-P-CCNC: 12 U/L (ref 10–44)
ANION GAP SERPL CALC-SCNC: 10 MMOL/L (ref 8–16)
AST SERPL-CCNC: 25 U/L (ref 10–40)
BILIRUB SERPL-MCNC: 1.1 MG/DL (ref 0.1–1)
BUN SERPL-MCNC: 21 MG/DL (ref 8–23)
CALCIUM SERPL-MCNC: 9.6 MG/DL (ref 8.7–10.5)
CHLORIDE SERPL-SCNC: 91 MMOL/L (ref 95–110)
CO2 SERPL-SCNC: 24 MMOL/L (ref 23–29)
CREAT SERPL-MCNC: 1.6 MG/DL (ref 0.5–1.4)
EST. GFR  (NO RACE VARIABLE): 43.8 ML/MIN/1.73 M^2
ESTIMATED AVG GLUCOSE: 160 MG/DL (ref 68–131)
GLUCOSE SERPL-MCNC: 138 MG/DL (ref 70–110)
HBA1C MFR BLD: 7.2 % (ref 4–5.6)
POTASSIUM SERPL-SCNC: 5.1 MMOL/L (ref 3.5–5.1)
PROT SERPL-MCNC: 7.3 G/DL (ref 6–8.4)
SODIUM SERPL-SCNC: 125 MMOL/L (ref 136–145)

## 2022-08-22 PROCEDURE — 83036 HEMOGLOBIN GLYCOSYLATED A1C: CPT | Performed by: NURSE PRACTITIONER

## 2022-08-22 PROCEDURE — 80053 COMPREHEN METABOLIC PANEL: CPT | Performed by: NURSE PRACTITIONER

## 2022-08-22 PROCEDURE — 36415 COLL VENOUS BLD VENIPUNCTURE: CPT | Mod: PO | Performed by: NURSE PRACTITIONER

## 2022-08-29 ENCOUNTER — OFFICE VISIT (OUTPATIENT)
Dept: ENDOCRINOLOGY | Facility: CLINIC | Age: 77
End: 2022-08-29
Payer: MEDICARE

## 2022-08-29 VITALS
BODY MASS INDEX: 26.43 KG/M2 | HEIGHT: 72 IN | WEIGHT: 195.13 LBS | HEART RATE: 84 BPM | SYSTOLIC BLOOD PRESSURE: 112 MMHG | DIASTOLIC BLOOD PRESSURE: 80 MMHG

## 2022-08-29 DIAGNOSIS — E11.22 TYPE 2 DIABETES MELLITUS WITH STAGE 3 CHRONIC KIDNEY DISEASE, WITHOUT LONG-TERM CURRENT USE OF INSULIN, UNSPECIFIED WHETHER STAGE 3A OR 3B CKD: Primary | ICD-10-CM

## 2022-08-29 DIAGNOSIS — E78.2 MIXED HYPERLIPIDEMIA: ICD-10-CM

## 2022-08-29 DIAGNOSIS — N18.30 TYPE 2 DIABETES MELLITUS WITH STAGE 3 CHRONIC KIDNEY DISEASE, WITHOUT LONG-TERM CURRENT USE OF INSULIN, UNSPECIFIED WHETHER STAGE 3A OR 3B CKD: Primary | ICD-10-CM

## 2022-08-29 DIAGNOSIS — E87.1 HYPONATREMIA: ICD-10-CM

## 2022-08-29 DIAGNOSIS — I25.10 CORONARY ARTERY DISEASE INVOLVING NATIVE CORONARY ARTERY OF NATIVE HEART WITHOUT ANGINA PECTORIS: ICD-10-CM

## 2022-08-29 PROCEDURE — 1126F AMNT PAIN NOTED NONE PRSNT: CPT | Mod: CPTII,S$GLB,, | Performed by: NURSE PRACTITIONER

## 2022-08-29 PROCEDURE — 1126F PR PAIN SEVERITY QUANTIFIED, NO PAIN PRESENT: ICD-10-PCS | Mod: CPTII,S$GLB,, | Performed by: NURSE PRACTITIONER

## 2022-08-29 PROCEDURE — 99214 OFFICE O/P EST MOD 30 MIN: CPT | Mod: S$GLB,,, | Performed by: NURSE PRACTITIONER

## 2022-08-29 PROCEDURE — 1101F PT FALLS ASSESS-DOCD LE1/YR: CPT | Mod: CPTII,S$GLB,, | Performed by: NURSE PRACTITIONER

## 2022-08-29 PROCEDURE — 3288F PR FALLS RISK ASSESSMENT DOCUMENTED: ICD-10-PCS | Mod: CPTII,S$GLB,, | Performed by: NURSE PRACTITIONER

## 2022-08-29 PROCEDURE — 1159F PR MEDICATION LIST DOCUMENTED IN MEDICAL RECORD: ICD-10-PCS | Mod: CPTII,S$GLB,, | Performed by: NURSE PRACTITIONER

## 2022-08-29 PROCEDURE — 3074F PR MOST RECENT SYSTOLIC BLOOD PRESSURE < 130 MM HG: ICD-10-PCS | Mod: CPTII,S$GLB,, | Performed by: NURSE PRACTITIONER

## 2022-08-29 PROCEDURE — 1101F PR PT FALLS ASSESS DOC 0-1 FALLS W/OUT INJ PAST YR: ICD-10-PCS | Mod: CPTII,S$GLB,, | Performed by: NURSE PRACTITIONER

## 2022-08-29 PROCEDURE — 1159F MED LIST DOCD IN RCRD: CPT | Mod: CPTII,S$GLB,, | Performed by: NURSE PRACTITIONER

## 2022-08-29 PROCEDURE — 3079F PR MOST RECENT DIASTOLIC BLOOD PRESSURE 80-89 MM HG: ICD-10-PCS | Mod: CPTII,S$GLB,, | Performed by: NURSE PRACTITIONER

## 2022-08-29 PROCEDURE — 99999 PR PBB SHADOW E&M-EST. PATIENT-LVL III: ICD-10-PCS | Mod: PBBFAC,,, | Performed by: NURSE PRACTITIONER

## 2022-08-29 PROCEDURE — 3079F DIAST BP 80-89 MM HG: CPT | Mod: CPTII,S$GLB,, | Performed by: NURSE PRACTITIONER

## 2022-08-29 PROCEDURE — 99999 PR PBB SHADOW E&M-EST. PATIENT-LVL III: CPT | Mod: PBBFAC,,, | Performed by: NURSE PRACTITIONER

## 2022-08-29 PROCEDURE — 1160F RVW MEDS BY RX/DR IN RCRD: CPT | Mod: CPTII,S$GLB,, | Performed by: NURSE PRACTITIONER

## 2022-08-29 PROCEDURE — 3074F SYST BP LT 130 MM HG: CPT | Mod: CPTII,S$GLB,, | Performed by: NURSE PRACTITIONER

## 2022-08-29 PROCEDURE — 99214 PR OFFICE/OUTPT VISIT, EST, LEVL IV, 30-39 MIN: ICD-10-PCS | Mod: S$GLB,,, | Performed by: NURSE PRACTITIONER

## 2022-08-29 PROCEDURE — 3288F FALL RISK ASSESSMENT DOCD: CPT | Mod: CPTII,S$GLB,, | Performed by: NURSE PRACTITIONER

## 2022-08-29 PROCEDURE — 1160F PR REVIEW ALL MEDS BY PRESCRIBER/CLIN PHARMACIST DOCUMENTED: ICD-10-PCS | Mod: CPTII,S$GLB,, | Performed by: NURSE PRACTITIONER

## 2022-08-29 RX ORDER — GENTAMICIN SULFATE 3 MG/ML
SOLUTION/ DROPS OPHTHALMIC
COMMUNITY
Start: 2022-07-07 | End: 2023-06-05

## 2022-08-29 RX ORDER — GLIMEPIRIDE 2 MG/1
TABLET ORAL
Qty: 135 TABLET | Refills: 3 | Status: SHIPPED | OUTPATIENT
Start: 2022-08-29 | End: 2023-01-09 | Stop reason: SDUPTHER

## 2022-08-29 NOTE — PROGRESS NOTES
CC: Mr. Felix Sheridan arrives today for management of Type 2 DM and review of chronic medical conditions, as listed in the visit diagnosis section of this encounter.     HPI: Mr. Felix Sheridan was diagnosed with Type 2 DM in 1974. He was diagnosed based on lab work. Initial treatment consisted of metformin. This was later stopped, due to renal function. + FH of DM in mother, brothers. Denies hospitalizations due to DM.   History of renal cell carcinoma status post partial nephrectomy years ago. He follows with nephrology (Dr. Cowan).  Patient has CAD with multiple past PCI. His cardiologist is Dr. Dykes.     Patient was last seen by me in May. At this time, glipizide was changed to glimepiride.     He has noticed higher fasting glucoses.     BG readings are checked 2x/day.           Hypoglycemia: denies   Hypoglycemic Symptoms:  nausea  Hypoglycemia Treatment: Coke    Missing Insulin/PO medication doses: No    Exercise: walks 3 miles 4-5 days/week.     Dietary Habits: Eats 3 meals/day. Dinner is early afternoon. Rare snack.  Avoids sugary beverages.     Patient states that he had outside blood work for cardiologist in June and sodium was normal after he had increased his salt intake. He has since stopped this and sodium level has dropped. He plans to increase his consumption again. Tries to stay well hydrated, due to renal function. Patent declines seeing endocrinologist.       CURRENT DIABETIC MEDS: glimepiride 2 mg daily  Glucometer type: Accucheck Guide    Previous DM treatments:  Metformin - discontinued due to renal function  Jardiance - discontinued due to renal function  Tradjenta - cost  Pioglitazone - BLE swelling  Glipizide     Last Eye Exam: 2020, Dr. Prado. No DR  Last Podiatry Exam: n/a    REVIEW OF SYSTEMS  Constitutional: no c/o fatigue, weakness, weight loss.   Eyes: denies visual disturbances.  Cardiac: no palpitations or chest pain.  Respiratory: no cough or dyspnea.  GI: no c/o abdominal  pain or nausea  Skin: no lesions or rashes.   Neuro: no numbness, tingling, or parasthesias.  Endocrine: denies polyphagia, polydipsia, polyuria      Personally reviewed Past Medical, Surgical, Social History.    Vital Signs  /80   Pulse 84   Ht 6' (1.829 m)   Wt 88.5 kg (195 lb 1.7 oz)   BMI 26.46 kg/m²     Personally reviewed the below labs:    Hemoglobin A1C   Date Value Ref Range Status   08/22/2022 7.2 (H) 4.0 - 5.6 % Final     Comment:     ADA Screening Guidelines:  5.7-6.4%  Consistent with prediabetes  >or=6.5%  Consistent with diabetes    High levels of fetal hemoglobin interfere with the HbA1C  assay. Heterozygous hemoglobin variants (HbS, HgC, etc)do  not significantly interfere with this assay.   However, presence of multiple variants may affect accuracy.     05/16/2022 7.1 (H) 4.0 - 5.6 % Final     Comment:     ADA Screening Guidelines:  5.7-6.4%  Consistent with prediabetes  >or=6.5%  Consistent with diabetes    High levels of fetal hemoglobin interfere with the HbA1C  assay. Heterozygous hemoglobin variants (HbS, HgC, etc)do  not significantly interfere with this assay.   However, presence of multiple variants may affect accuracy.     01/14/2022 7.6 (H) 4.0 - 5.6 % Final     Comment:     ADA Screening Guidelines:  5.7-6.4%  Consistent with prediabetes  >or=6.5%  Consistent with diabetes    High levels of fetal hemoglobin interfere with the HbA1C  assay. Heterozygous hemoglobin variants (HbS, HgC, etc)do  not significantly interfere with this assay.   However, presence of multiple variants may affect accuracy.         Chemistry        Component Value Date/Time     (L) 08/22/2022 1038    K 5.1 08/22/2022 1038    CL 91 (L) 08/22/2022 1038    CO2 24 08/22/2022 1038    BUN 21 08/22/2022 1038    CREATININE 1.6 (H) 08/22/2022 1038     (H) 08/22/2022 1038        Component Value Date/Time    CALCIUM 9.6 08/22/2022 1038    ALKPHOS 71 08/22/2022 1038    AST 25 08/22/2022 1038    ALT 12  08/22/2022 1038    BILITOT 1.1 (H) 08/22/2022 1038    ESTGFRAFRICA 50.8 (A) 05/16/2022 1031    EGFRNONAA 44.0 (A) 05/16/2022 1031          Lab Results   Component Value Date    CHOL 126 09/10/2021     Lab Results   Component Value Date    HDL 47 09/10/2021     Lab Results   Component Value Date    LDLCALC 63.8 09/10/2021     Lab Results   Component Value Date    TRIG 76 09/10/2021     Lab Results   Component Value Date    CHOLHDL 37.3 09/10/2021       Lab Results   Component Value Date    MICALBCREAT 37.5 (H) 08/22/2022     No results found for: TSH    Estimated Creatinine Clearance: 41.8 mL/min (A) (based on SCr of 1.6 mg/dL (H)).    No results found for: [id number]      PHYSICAL EXAMINATION  Constitutional: Appears well, no distress  Neck: Supple, trachea midline.  Respiratory: CTA, even and unlabored.  Cardiovascular: RRR, no murmurs, no carotid bruits.  GI: active bowel sounds, no hernia noted.  Skin: warm and dry  Neuro: oriented to person, place, time  Feet: appropriate footwear.       Goals        HEMOGLOBIN A1C < 8               Assessment/Plan  1. Type 2 diabetes mellitus with stage 3 chronic kidney disease, without long-term current use of insulin, unspecified whether stage 3a or 3b CKD  -- A1c is acceptable. However, fasting hyperglycemia is noted. He is requesting to change glimepiride to BID. Cannot use certain oral agents due to renal function and cost.   -- change glimepiride to 2 mg QAM, 1 mg QPM (have with a night snack).  -- BG monitoring 1x/day, alternating times.  -- recommended eye camera today but he declined. He would prefer to see his regular eye doctor for exam.     -- Reviewed hypoglycemia management: treat with 4 oz of juice, 4 oz regular soda, or 4 glucose tablets. Monitor and repeat treatment every 15 minutes until BG is >70 Then have a snack, which includes 15 grams of complex carbohydrates and protein.   Advised patient to check BG before activities, such as driving or exercise.  --  takes statin, ARB, aspirin   2. Coronary artery disease involving native coronary artery of native heart without angina pectoris  -- stable  -- follows with cardiology   3. Mixed hyperlipidemia  -- controlled  -- continue pravastatin  -- lipid panel with RTC   4. Hyponatremia  -- declined referral to endocrinologist  -- he will address with his cardiologist. Labs were printed and given to patient.         FOLLOW UP  Follow up in about 4 months (around 12/29/2022).   Patient instructed to bring BG logs to each follow up   Patient encouraged to call for any BG/medication issues, concerns, or questions.    Orders Placed This Encounter   Procedures    Hemoglobin A1C    Lipid Panel    Basic Metabolic Panel

## 2023-01-04 ENCOUNTER — LAB VISIT (OUTPATIENT)
Dept: LAB | Facility: HOSPITAL | Age: 78
End: 2023-01-04
Payer: MEDICARE

## 2023-01-04 DIAGNOSIS — E11.22 TYPE 2 DIABETES MELLITUS WITH STAGE 3 CHRONIC KIDNEY DISEASE, WITHOUT LONG-TERM CURRENT USE OF INSULIN, UNSPECIFIED WHETHER STAGE 3A OR 3B CKD: ICD-10-CM

## 2023-01-04 DIAGNOSIS — N18.30 TYPE 2 DIABETES MELLITUS WITH STAGE 3 CHRONIC KIDNEY DISEASE, WITHOUT LONG-TERM CURRENT USE OF INSULIN, UNSPECIFIED WHETHER STAGE 3A OR 3B CKD: ICD-10-CM

## 2023-01-04 LAB
ANION GAP SERPL CALC-SCNC: 5 MMOL/L (ref 8–16)
BUN SERPL-MCNC: 22 MG/DL (ref 8–23)
CALCIUM SERPL-MCNC: 10.2 MG/DL (ref 8.7–10.5)
CHLORIDE SERPL-SCNC: 100 MMOL/L (ref 95–110)
CHOLEST SERPL-MCNC: 134 MG/DL (ref 120–199)
CHOLEST/HDLC SERPL: 3 {RATIO} (ref 2–5)
CO2 SERPL-SCNC: 29 MMOL/L (ref 23–29)
CREAT SERPL-MCNC: 1.6 MG/DL (ref 0.5–1.4)
EST. GFR  (NO RACE VARIABLE): 43.6 ML/MIN/1.73 M^2
ESTIMATED AVG GLUCOSE: 174 MG/DL (ref 68–131)
GLUCOSE SERPL-MCNC: 161 MG/DL (ref 70–110)
HBA1C MFR BLD: 7.7 % (ref 4–5.6)
HDLC SERPL-MCNC: 44 MG/DL (ref 40–75)
HDLC SERPL: 32.8 % (ref 20–50)
LDLC SERPL CALC-MCNC: 65 MG/DL (ref 63–159)
NONHDLC SERPL-MCNC: 90 MG/DL
POTASSIUM SERPL-SCNC: 5.8 MMOL/L (ref 3.5–5.1)
SODIUM SERPL-SCNC: 134 MMOL/L (ref 136–145)
TRIGL SERPL-MCNC: 125 MG/DL (ref 30–150)

## 2023-01-04 PROCEDURE — 80048 BASIC METABOLIC PNL TOTAL CA: CPT | Performed by: NURSE PRACTITIONER

## 2023-01-04 PROCEDURE — 36415 COLL VENOUS BLD VENIPUNCTURE: CPT | Mod: PO | Performed by: NURSE PRACTITIONER

## 2023-01-04 PROCEDURE — 80061 LIPID PANEL: CPT | Performed by: NURSE PRACTITIONER

## 2023-01-04 PROCEDURE — 83036 HEMOGLOBIN GLYCOSYLATED A1C: CPT | Performed by: NURSE PRACTITIONER

## 2023-01-05 ENCOUNTER — TELEPHONE (OUTPATIENT)
Dept: ENDOCRINOLOGY | Facility: CLINIC | Age: 78
End: 2023-01-05
Payer: MEDICARE

## 2023-01-05 DIAGNOSIS — E87.5 HYPERKALEMIA: Primary | ICD-10-CM

## 2023-01-05 NOTE — TELEPHONE ENCOUNTER
Notified pt of Jumana's prev msg and verb understanding  Pt states he is taking 1/2 to 1 cap of ibuprofen @ PM w/benadryl to help him sleep. Notified him to cut back or take tylenol preferably. Pt states would like to do K lab on Monday after she sees Jumana

## 2023-01-05 NOTE — TELEPHONE ENCOUNTER
Please call patient. A1c increased to 7.7% However, his potassium is pretty elevated at 5.8. Is he taking any Aleve, ibuprofen? Taking any over the counter potassium? Eating high potassium foods in excess? Bananas, oranges, grapefruit, spinach, broccoli, potatoes, leafy greens, beans, peas. If so, please advise him to cut back. Please schedule potassium lab for tomorrow or Saturday, if possible. If he can't make that, can schedule AFTER he sees me Monday AM.

## 2023-01-09 ENCOUNTER — OFFICE VISIT (OUTPATIENT)
Dept: ENDOCRINOLOGY | Facility: CLINIC | Age: 78
End: 2023-01-09
Payer: MEDICARE

## 2023-01-09 VITALS
WEIGHT: 196.56 LBS | BODY MASS INDEX: 26.62 KG/M2 | HEART RATE: 86 BPM | DIASTOLIC BLOOD PRESSURE: 80 MMHG | HEIGHT: 72 IN | SYSTOLIC BLOOD PRESSURE: 136 MMHG

## 2023-01-09 DIAGNOSIS — E87.5 HYPERKALEMIA: ICD-10-CM

## 2023-01-09 DIAGNOSIS — E11.22 TYPE 2 DIABETES MELLITUS WITH STAGE 3B CHRONIC KIDNEY DISEASE, WITHOUT LONG-TERM CURRENT USE OF INSULIN: Primary | ICD-10-CM

## 2023-01-09 DIAGNOSIS — E11.22 TYPE 2 DIABETES MELLITUS WITH STAGE 3 CHRONIC KIDNEY DISEASE, WITHOUT LONG-TERM CURRENT USE OF INSULIN, UNSPECIFIED WHETHER STAGE 3A OR 3B CKD: ICD-10-CM

## 2023-01-09 DIAGNOSIS — E78.2 MIXED HYPERLIPIDEMIA: ICD-10-CM

## 2023-01-09 DIAGNOSIS — I25.10 CORONARY ARTERY DISEASE INVOLVING NATIVE CORONARY ARTERY OF NATIVE HEART WITHOUT ANGINA PECTORIS: ICD-10-CM

## 2023-01-09 DIAGNOSIS — N18.30 TYPE 2 DIABETES MELLITUS WITH STAGE 3 CHRONIC KIDNEY DISEASE, WITHOUT LONG-TERM CURRENT USE OF INSULIN, UNSPECIFIED WHETHER STAGE 3A OR 3B CKD: ICD-10-CM

## 2023-01-09 DIAGNOSIS — N18.32 TYPE 2 DIABETES MELLITUS WITH STAGE 3B CHRONIC KIDNEY DISEASE, WITHOUT LONG-TERM CURRENT USE OF INSULIN: Primary | ICD-10-CM

## 2023-01-09 PROCEDURE — 99999 PR PBB SHADOW E&M-EST. PATIENT-LVL IV: CPT | Mod: PBBFAC,,, | Performed by: NURSE PRACTITIONER

## 2023-01-09 PROCEDURE — 1159F MED LIST DOCD IN RCRD: CPT | Mod: CPTII,S$GLB,, | Performed by: NURSE PRACTITIONER

## 2023-01-09 PROCEDURE — 1160F PR REVIEW ALL MEDS BY PRESCRIBER/CLIN PHARMACIST DOCUMENTED: ICD-10-PCS | Mod: CPTII,S$GLB,, | Performed by: NURSE PRACTITIONER

## 2023-01-09 PROCEDURE — 99214 PR OFFICE/OUTPT VISIT, EST, LEVL IV, 30-39 MIN: ICD-10-PCS | Mod: S$GLB,,, | Performed by: NURSE PRACTITIONER

## 2023-01-09 PROCEDURE — 99999 PR PBB SHADOW E&M-EST. PATIENT-LVL IV: ICD-10-PCS | Mod: PBBFAC,,, | Performed by: NURSE PRACTITIONER

## 2023-01-09 PROCEDURE — 1101F PT FALLS ASSESS-DOCD LE1/YR: CPT | Mod: CPTII,S$GLB,, | Performed by: NURSE PRACTITIONER

## 2023-01-09 PROCEDURE — 1126F PR PAIN SEVERITY QUANTIFIED, NO PAIN PRESENT: ICD-10-PCS | Mod: CPTII,S$GLB,, | Performed by: NURSE PRACTITIONER

## 2023-01-09 PROCEDURE — 1101F PR PT FALLS ASSESS DOC 0-1 FALLS W/OUT INJ PAST YR: ICD-10-PCS | Mod: CPTII,S$GLB,, | Performed by: NURSE PRACTITIONER

## 2023-01-09 PROCEDURE — 3075F SYST BP GE 130 - 139MM HG: CPT | Mod: CPTII,S$GLB,, | Performed by: NURSE PRACTITIONER

## 2023-01-09 PROCEDURE — 99214 OFFICE O/P EST MOD 30 MIN: CPT | Mod: S$GLB,,, | Performed by: NURSE PRACTITIONER

## 2023-01-09 PROCEDURE — 3288F FALL RISK ASSESSMENT DOCD: CPT | Mod: CPTII,S$GLB,, | Performed by: NURSE PRACTITIONER

## 2023-01-09 PROCEDURE — 1160F RVW MEDS BY RX/DR IN RCRD: CPT | Mod: CPTII,S$GLB,, | Performed by: NURSE PRACTITIONER

## 2023-01-09 PROCEDURE — 3075F PR MOST RECENT SYSTOLIC BLOOD PRESS GE 130-139MM HG: ICD-10-PCS | Mod: CPTII,S$GLB,, | Performed by: NURSE PRACTITIONER

## 2023-01-09 PROCEDURE — 3288F PR FALLS RISK ASSESSMENT DOCUMENTED: ICD-10-PCS | Mod: CPTII,S$GLB,, | Performed by: NURSE PRACTITIONER

## 2023-01-09 PROCEDURE — 3079F PR MOST RECENT DIASTOLIC BLOOD PRESSURE 80-89 MM HG: ICD-10-PCS | Mod: CPTII,S$GLB,, | Performed by: NURSE PRACTITIONER

## 2023-01-09 PROCEDURE — 1159F PR MEDICATION LIST DOCUMENTED IN MEDICAL RECORD: ICD-10-PCS | Mod: CPTII,S$GLB,, | Performed by: NURSE PRACTITIONER

## 2023-01-09 PROCEDURE — 3079F DIAST BP 80-89 MM HG: CPT | Mod: CPTII,S$GLB,, | Performed by: NURSE PRACTITIONER

## 2023-01-09 PROCEDURE — 1126F AMNT PAIN NOTED NONE PRSNT: CPT | Mod: CPTII,S$GLB,, | Performed by: NURSE PRACTITIONER

## 2023-01-09 RX ORDER — GLIMEPIRIDE 2 MG/1
TABLET ORAL
Qty: 180 TABLET | Refills: 3 | Status: SHIPPED | OUTPATIENT
Start: 2023-01-09 | End: 2023-06-05

## 2023-01-09 NOTE — PROGRESS NOTES
CC: Mr. Felix Sheridan arrives today for management of Type 2 DM and review of chronic medical conditions, as listed in the visit diagnosis section of this encounter.     HPI: Mr. Felix Sheridan was diagnosed with Type 2 DM in 1974. He was diagnosed based on lab work. Initial treatment consisted of metformin. This was later stopped, due to renal function. + FH of DM in mother, brothers. Denies hospitalizations due to DM.   History of renal cell carcinoma status post partial nephrectomy years ago. He follows with nephrology (Dr. Cowan).  Patient has CAD with multiple past PCI. His cardiologist is Dr. Dykes.     Patient was last seen by me in August. At this time, glimepiride dose was increased.    He has had more stress over the past few months. His wife's short term memory loss is contributing to the stress. She will be starting new medication to assist with this.     Has been taking Motrin at night, with Benadryl, to help with sleep. After labs resulted last week, revealing hyperkalemia, he has stopped taking Motrin. Another provider repeated metabolic panel the following day and potassium decreased to 5.1.    Patient states that he has been increasing glimepiride if BG is elevated and now runs out of supply early. Does state that he's actually taking PM dose in the evening, hours after eating dinner.     BG readings are checked 2x/day.               Hypoglycemia: Rare   Hypoglycemic Symptoms:  nausea  Hypoglycemia Treatment: Coke    Missing Insulin/PO medication doses: No    Exercise: recently resumed walking.    Dietary Habits: Eats 3 meals/day. Rare snack.  Avoids sugary beverages.         CURRENT DIABETIC MEDS: glimepiride 2 mg w/breakfast, 1 mg at night  Glucometer type: Accucheck Guide    Previous DM treatments:  Metformin - discontinued due to renal function  Jardiance - discontinued due to renal function  Tradjenta - cost  Pioglitazone - BLE swelling  Glipizide     Last Eye Exam: 2022, No DR, per pt  Last  Podiatry Exam: n/a    REVIEW OF SYSTEMS  Constitutional: no c/o fatigue, weakness, weight loss.   Eyes: denies visual disturbances.  Cardiac: no palpitations or chest pain.  Respiratory: no cough or dyspnea.  GI: no c/o abdominal pain or nausea  Skin: no lesions or rashes.   Neuro: no numbness, tingling, or parasthesias.  Endocrine: denies polyphagia, polydipsia, polyuria      Personally reviewed Past Medical, Surgical, Social History.    Vital Signs  /80   Pulse 86   Ht 6' (1.829 m)   Wt 89.1 kg (196 lb 8.6 oz)   BMI 26.66 kg/m²      Personally reviewed the below labs:    External labs dated 1/5/2023:          Hemoglobin A1C   Date Value Ref Range Status   01/04/2023 7.7 (H) 4.0 - 5.6 % Final     Comment:     ADA Screening Guidelines:  5.7-6.4%  Consistent with prediabetes  >or=6.5%  Consistent with diabetes    High levels of fetal hemoglobin interfere with the HbA1C  assay. Heterozygous hemoglobin variants (HbS, HgC, etc)do  not significantly interfere with this assay.   However, presence of multiple variants may affect accuracy.     08/22/2022 7.2 (H) 4.0 - 5.6 % Final     Comment:     ADA Screening Guidelines:  5.7-6.4%  Consistent with prediabetes  >or=6.5%  Consistent with diabetes    High levels of fetal hemoglobin interfere with the HbA1C  assay. Heterozygous hemoglobin variants (HbS, HgC, etc)do  not significantly interfere with this assay.   However, presence of multiple variants may affect accuracy.     05/16/2022 7.1 (H) 4.0 - 5.6 % Final     Comment:     ADA Screening Guidelines:  5.7-6.4%  Consistent with prediabetes  >or=6.5%  Consistent with diabetes    High levels of fetal hemoglobin interfere with the HbA1C  assay. Heterozygous hemoglobin variants (HbS, HgC, etc)do  not significantly interfere with this assay.   However, presence of multiple variants may affect accuracy.         Chemistry        Component Value Date/Time     (L) 01/04/2023 0845    K 5.8 (H) 01/04/2023 0845      01/04/2023 0845    CO2 29 01/04/2023 0845    BUN 22 01/04/2023 0845    CREATININE 1.6 (H) 01/04/2023 0845     (H) 01/04/2023 0845        Component Value Date/Time    CALCIUM 10.2 01/04/2023 0845    ALKPHOS 71 08/22/2022 1038    AST 25 08/22/2022 1038    ALT 12 08/22/2022 1038    BILITOT 1.1 (H) 08/22/2022 1038    ESTGFRAFRICA 50.8 (A) 05/16/2022 1031    EGFRNONAA 44.0 (A) 05/16/2022 1031          Lab Results   Component Value Date    CHOL 134 01/04/2023    CHOL 126 09/10/2021     Lab Results   Component Value Date    HDL 44 01/04/2023    HDL 47 09/10/2021     Lab Results   Component Value Date    LDLCALC 65.0 01/04/2023    LDLCALC 63.8 09/10/2021     Lab Results   Component Value Date    TRIG 125 01/04/2023    TRIG 76 09/10/2021     Lab Results   Component Value Date    CHOLHDL 32.8 01/04/2023    CHOLHDL 37.3 09/10/2021       Lab Results   Component Value Date    MICALBCREAT 37.5 (H) 08/22/2022     No results found for: TSH    CrCl cannot be calculated (Unknown ideal weight.).    No results found for: [id number]      PHYSICAL EXAMINATION  Constitutional: Appears well, no distress  Neck: Supple, trachea midline.  Respiratory: CTA, even and unlabored.  Cardiovascular: RRR, no murmurs, no carotid bruits.  GI: active bowel sounds, no hernia noted.  Skin: warm and dry  Neuro: oriented to person, place, time  Feet: appropriate footwear.       Goals        HEMOGLOBIN A1C < 8               Assessment/Plan  1. Type 2 diabetes mellitus with stage 3b chronic kidney disease, without long-term current use of insulin -- A1c has increased. Cannot use certain oral agents due to renal function and cost.   -- change glimepiride to 2 mg BID with meals.   -- BG monitoring 1x/day, alternating times. Advised him to notify me if having any hypoglycemia.     -- Reviewed hypoglycemia management: treat with 4 oz of juice, 4 oz regular soda, or 4 glucose tablets. Monitor and repeat treatment every 15 minutes until BG is >70 Then have  a snack, which includes 15 grams of complex carbohydrates and protein.   Advised patient to check BG before activities, such as driving or exercise.  -- takes statin, ARB, aspirin   2. Coronary artery disease involving native coronary artery of native heart without angina pectoris  -- stable  -- follows with cardiology   3. Mixed hyperlipidemia  -- controlled  -- continue pravastatin   4. Hyperkalemia  -- potassium on labs ordered by another provider  -- recommended that he avoid NSAIDs and reduce intake of high potassium foods.          FOLLOW UP  Follow up in about 4 months (around 5/9/2023).   Patient instructed to bring BG logs to each follow up   Patient encouraged to call for any BG/medication issues, concerns, or questions.    Orders Placed This Encounter   Procedures    Hemoglobin A1C    Comprehensive Metabolic Panel

## 2023-05-04 ENCOUNTER — TELEPHONE (OUTPATIENT)
Dept: HEMATOLOGY/ONCOLOGY | Facility: CLINIC | Age: 78
End: 2023-05-04
Payer: MEDICARE

## 2023-05-04 DIAGNOSIS — C44.42 SQUAMOUS CELL CARCINOMA OF NECK: Primary | ICD-10-CM

## 2023-05-04 DIAGNOSIS — C64.9 RENAL CELL CARCINOMA: Primary | ICD-10-CM

## 2023-05-04 NOTE — NURSING
Received paper referral from Dr. Adams for patient to see Dr. Alfred. Appt date, time, and location provided to patient and he verbalized understanding.    Oncology Navigation   Intake  Cancer Type: Head and Neck  Internal / External Referral: External  Date of Referral: 05/04/23  Initial Nurse Navigator Contact: 05/04/23  Referral to Initial Contact Timeline (days): 0  Date Worked: 05/04/23  First Appointment Available: 05/11/23  Appointment Date: 05/11/23  First Available Date vs. Scheduled Date (days): 0     Treatment  Current Status: Staging work-up    Surgical Oncologist: Dr. Yris Alfred  Consult Date: 05/11/23    Medical Oncologist: Dr. Karolyn Adams       Procedures: PET scan  PET Scan Schedule Date: 04/26/23                 Acuity      Follow Up  No follow-ups on file.

## 2023-05-05 ENCOUNTER — DOCUMENTATION ONLY (OUTPATIENT)
Dept: ADMINISTRATIVE | Facility: OTHER | Age: 78
End: 2023-05-05
Payer: MEDICARE

## 2023-05-10 NOTE — PROGRESS NOTES
NAME: Felix Sheridan    : 1945 SEX: M MR#: [id number]   DIAGNOSIS: 1. 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  It is highly likely that this represents a metastasis from a squamous cell carcinoma of the immediately  adjacent skin.   Head and neck cancer or other primaries seem much less likely.  HPV 16 and 18 testing from the  biopsy results are pending.    2. History of multiple skin cancers, including squamous cell carcinomas of the skin.     3. Left partial nephrectomy in  for renal cell carcinoma.     REFERRING PHYSICIAN: Karolyn Adams M.D.   DATE OF CONSULTATION: 2023     PRESENT ILLNESS:  The patient is a 77-year-old  white male.  In , he had a left partial nephrectomy for a kidney cancer.  This surgery was performed in [place], [place].  The patient had no additional treatment and has been without evidence of recurrence.     The patient has a very heavy sun exposure history.  He has had numerous skin cancers.  I have requested records; however, many of these records have not been obtainable yet.  I was able to obtain a pathology report of a biopsy in the left parietal scalp performed on 2021, and this biopsy showed squamous cell carcinoma.     I questioned the patient and he said that he has had a number of skin cancers treated by the [facility] in [place].  I have requested these records.  Some of the locations of these skin cancers will be mentioned in the Physical Exam section.     Overall, the patient estimates he has had at least five skin cancers and many of these apparently have been squamous cell carcinomas.     In April of this year, the patient saw Dr. Aaron because he had noticed an increasing mass in his left inferior level V region of the neck.  The patient denied any other symptoms.     On  of this year, an ultrasound was performed which showed a 20 mm mass in what was described as  the supraclavicular region.     On April 13 of this year, Dr. Lemus performed an excisional biopsy of what was described as a left supraclavicular lymph node.  Pathology showed p16-positive squamous cell carcinoma.     On April 26 of this year, a PET-CT scan was performed.  I have reviewed the images, as well as read the radiologist's interpretation.  The scan was unremarkable, except for postoperative changes noted at the site of the previous excisional biopsy.  There was no evidence of primary and no other suspicious regions.     On May 3, the patient saw Dr. Adams.  After Dr. Adams saw the patient, the patient has been referred to Dr. Alfred, as well as to me.     BUN and creatinine from today show a BUN of 19, a creatinine of 1.47, and an EGFR of 48.      The patient denies any symptoms, except for some slight discomfort at the site of the April 13 excisional biopsy.  He denies any pain.  He has no cranial nerve symptoms.  He has no swallowing difficulty.  There is no hoarseness.  The patient has a very good performance status and remains active.  His weight is stable at 187 pounds.    PAST MEDICAL HISTORY:  The patient's filled-out forms include symptoms, as well as past medical history and relevant factors have been reviewed.     Medical:  Multiple skin cancers, including squamous cell carcinomas of the skin, left partial nephrectomy for renal cell carcinoma, atherosclerotic heart disease, pacemaker that is managed by Dr. Perez, diabetes, renal insufficiency, hyperlipidemia.     No previous radiation therapy or chemotherapy history.    PREVIOUS SURGERIES:  A left partial nephrectomy in 2013, multiple skin cancer surgeries, left level V excisional lymph node excision in April 2023.    FAMILY HISTORY:  Positive for mother with colon cancer.    TOBACCO HISTORY:  The patient has a five-year pack-year smoking history; however, he has not used any tobacco of any kind for the past 55 years.    ALCOHOL:  The patient  drinks two or three beers several times a day.    ALLERGIES:  No known allergies.    MEDICATIONS:  The patient is on six medications and they are recorded in the clinic records.    PHYSICAL EXAMINATION:  GENERAL:  Shows a healthy-appearing man in no distress.  He has an excellent performance status (performance score =0).   SKIN:  Shows evidence of heavy sun damage.  A scar is present in the left scalp consistent with the known squamous cell carcinoma  skin cancer surgery.  In the inferior level V region of the left neck, there is a scar consistent with April 13, 2023, excisional lymph node biopsy.  It should be noted that this scar not in the typical left supraclavicular position, and once again I think that this probably is more accurately described as an inferior left level V lymph node.  Of significant importance, there is a 6 cm  scar very near the site of the excisional biopsy and this is from a previous skin cancer.  There is also a 4 cm scar fairly near this excisional biopsy site, and again this is from a previous skin cancer.  According to the patient, these skin cancers were resected in [facility] and I have requested those records.  There is no cervical lymphadenopathy, although there is some induration as would be expected underneath the April 13, 2023, excisional biopsy site.     NEUROLOGIC:  Cranial nerve examination is unremarkable.   HEENT:  The oral cavity shows the patient has almost all of his teeth and they appear to be in good condition. There are no oral cavity or oropharynx lesions.     A pacemaker is present in the left infraclavicular area.  This pacemaker is relatively close to the  site of the two adjacent skin cancers and to the April 13, 2023, excisional biopsy site.    A fiberoptic examination of the entire pharynx and larynx is completely unremarkable.     LAB AND X-RAYS:  See Present Illness section.    ASSESSMENT/GOALS/PLAN:  The patient has a history of multiple skin cancers,  including squamous cell carcinomas of the skin.  Recent excision biopsy showed a   p16-positive squamous cell carcinoma metastatic to left inferior level V cervical lymph node.  It is highly likely that this represents a metastasis from a squamous cell carcinoma of the immediately  adjacent skin.   Head and neck cancer or other primaries seem much less likely.   As mentioned earlier in this dictation, the location of the excisional biopsy I think is more consistent with an inferior level V lymph node than a classic supraclavicular lymph node, and this again would make skin cancer much more likely.  To have a head and neck cancer metastasize to this region without other lymphadenopathy would be distinctly unlikely.  Just because this squamous cell carcinoma was p16 positive, does not mean that it is related to an  oropharynx primary.      I have gone ahead and requested that HPV 16 and HPV 18 testing be performed on the April 13 biopsy.  I suspect that the  HPV 16 and HPV 18 testing will be negative and this testing  should be much more accurate than relying on the surrogate p16 status.       [facility] [place] [facility], and I suspect that the two larger scars next to the left neck excisional biopsy scars will prove to be squamous cell carcinoma.  I had planned on obtaining a diagnostic CT of the neck and chest with IV contrast; however, the EGFR that I obtained today was only 48, and it is not worth risking the patient's kidney function and I have canceled the diagnostic CT scan.  The patient did have a good PET scan that was completely negative, except for the site of the excisional biopsy.      The patient will be seeing Dr. Yris Alfred on May 11.  Assuming Dr. Alfred is in agreement, I am recommending radiation treatment to the site surrounding the left inferior level V excisional biopsy site. I probably will also include the two immediately adjacent scars from skin  cancers, although this decision will be partly predicated on the information I get from the [facility].  The pacemaker is relatively nearby, but we should be able to keep the pacemaker to a low dose.  I am considering a postoperative dose of approximately 60 to 64 Gy delivered in six to six and a half weeks at 2 Gy per fraction.  I think there is an excellent chance for permanent control.  The patient and his daughter who participated via telephone were in agreement with this strategy.       140 minutes was spent on this lengthy consultation, 35 minutes was spent in record and image review, 85 minutes was spent directly with the patient and his daughter, and 20 minutes was spent in documentation.     Side effects and possible complications were discussed with the patient and his daughter.  Acute effects will include moist skin desquamation (I will purposefully be bolusing to increase the skin dose since I think that this very likely is from a skin primary in an adjacent skin).  Serious late effects would be fairly relatively uncommon.    I have reviewed a site-specific consent form, all questions were answered and the patient has good understanding.  The patient has signed the site-specific consent form.     I would like to thank all of Mr. Sheridan' physicians for the opportunity to consult on their patients.  Any of these physicians should feel free to call me with any questions or comments.          Electronically Signed By: TRIPP Kay/Yuko  DD:  05/05/2023  DT:  05/05/2023  Job #:  [id number]    CC:  Mulugeta Lemus M.D., [address] [address], Fax: [phone]        Margo Robb M.D., [address] [place] [address], Fax: [phone]        Karolyn Adams M.D., [address] [place] [address], Fax: [phone]        Jamie Perez M.D., [address] [place] [address], Fax: [phone]         Yris Alfred M.D., [address], [place] [address], Fax: [phone]        Moisés Cowan M.D., [facility] [place] [address], Fax: [phone]       ADDENDUM:  HPV 16 and 18 testing from the April 13 biopsy results are pending.      [facility] [place] and the records show pathology reports documenting multiple squamous cell carcinomas of the skin including a squamous cell carcinomas of the skin of the left shoulder treated with resection in 2008 and a squamous cell carcinomas of the skin of the left back treated with resection in 2016.  There are probably many more squamous cell carcinomas of the skin that I do not have pathology reports to confirm.      Dr. Alfred will see the patient on May 11.

## 2023-05-10 NOTE — PROGRESS NOTES
Date of Encounter: 5/11/2023  Provider: Yris Alfred MD  Referring MD:Yandel Porter MD  PCP: Moisés Cowan MD  Rad Onc: Yandel Porter MD  Med Onc: Karolyn Adams MD  Cardiology: MD Ana  Endo: Jumana Salazar, JACQUE  [facility]  General Surgeon: MD Lotus ( [facility])  Dermatology: [facility]   Moh's: Tre Monique;anc??    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 Yandel Porter in General of the [facility].  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  Patient noted a neck mass about 6 weeks ago and was referred to general surgery for diagnosis.  An excisional biopsy was performed which revealed squamous cell carcinoma, P 16 positive.   Patient has no complaints status post excisional biopsy.    Patient denies dysphagia, odynophagia, hemoptysis and otalgia.  Dr. Porter performed a flexible laryngoscopy was showed no lesions in the pharynx or larynx.      ROS: see HPI  Constitutional: Negative for activity change and appetite change, weight loss.   Eyes: Negative for discharge, visual changes.   Respiratory: Negative for difficulty breathing and wheezing   Cardiovascular: Negative for chest pain.   Gastrointestinal: Negative for abdominal distention and abdominal pain.   Endocrine: Negative for cold intolerance and heat intolerance.   Genitourinary: Negative for dysuria.   Musculoskeletal: Negative for gait problem, muscle pain and joint swelling.   Skin: Negative for color change and pallor; negative for skin lesions.   Neurological: Negative for syncope and weakness; no numbness face.   Psychiatric/Behavioral: Negative for agitation and confusion; negative for depression.    Physical Exam:       Constitutional  General Appearance: well nourished, well-developed, alert, oriented, in no acute distress  Communication: ability, understanding, normal  Head and Face  Inspection: normocephalic, atraumatic, depressed scar approximately 5 cm in length left frontoparietal scalp    Palpation: no stepoffs, sinus tenderness or masses  Parotid glands: no masses, stones, swelling or tenderness  Eyes  Ocular Motility / Alignment: normal alignment, motility, no proptosis, enophthalmus or nystagmus  Conjunctiva: not injected  Eyelids: no hooding, lag, entropion, or ectropion  Ears  Hearing: speech reception thresholds grossly normal  External Ears: no auricle lesions, non-tender, mobile to palpation  Otoscopy:  Right Ear: no tympanic membrane lesions, perforations, or effusion, normal EAC  Left Ear: no tympanic membrane lesions, perforations, or effusion, normal EAC  Nose  External Nose: no lesions, tenderness, trauma or deformity  Oral Cavity / Oropharynx  Lips: upper and lower lips pink and moist  Teeth: good dentition  Gingiva: healthy  Oral Mucosa: moist, no mucosal lesions  Floor of Mouth: normal, no lesions, salivary ducts patent  Tongue: moist, normal mobility, no lesions  Palate: soft and hard palates without lesions or ulcers  Oropharynx: tonsils and walls without erythema, exudate, base of tongue soft to palpation  Nasopharynx, Hypopharynx, and Larynx  Indirect: base of tongue and larynx normal  Neck  Inspection and Palpation: no erythema, induration, emphysema, tenderness or masses; incision intact left level 5  Larynx and Trachea: normal position; normal crepitus  Thyroid: no tenderness, enlargement or nodules  Submandibular Glands: no masses or tenderness  Lymphatic:  Anterior, Posterior, Submandibular, Submental, Supraclavicular: no lymphadenopathy present  Chest / Respiratory  Chest: no stridor or retractions, normal effort and expansion  Cardiovascular:  Pulses: 2+ carotid pulses bilaterally  Auscultation:  deferred  Neurological  Cranial Nerves: grossly intact  General: no focal deficits  Psychiatric  Orientation: oriented to time, place and person  Mood and Affect: no depression, anxiety or agitation  Extremities:  2 well liz scars left shoulder inferior and lateral to the area of the excised lymph node  Donor site  Chest, Back, Abdomen, Arms, Legs: N/A        PATH  FINAL PATHOLOGIC DIAGNOSIS     Left neck lymph node, excision:                                         - Positive for metastatic keratinizing squamous cell carcinoma, p16 positive.                                                                 Comment: Immunohistochemical stains for p40 and p16 are positive in     the tumor, confirming a p16 positive squamous cell carcinoma.              PET CT scan: results scanned under media--reviewed:  Vocal area of nodular inflammatory stranding in the left posterior cervical soft tissues with overlying skin thickening and low level hypermetabolic activity suspicious for malignancy.  There is an internal focus of air within this lesion.  Recommend correlation with recent tissue sampling.    I personally reviewed the following imaging: CT neck without contrast    US neck: Findings:  Targeted sonographic evaluation of the patient's left supra clavicular mass was performed. This demonstrated a solid 20 x 16 x 20 mm lymph node with loss of normal architecture and thickening of the cortex.    Records reviewed:  Dr Yandel Porter 5/9/2023  ASSESSMENT/GOALS/PLAN:  The patient has a history of multiple skin cancers, including squamous cell carcinomas of the skin.  Recent excision biopsy showed a   p16-positive squamous cell carcinoma metastatic to left inferior level V cervical lymph node.  It is highly likely that this represents a metastasis from a squamous cell carcinoma of the immediately  adjacent skin.   Head and neck cancer or other primaries seem much less likely.   As mentioned earlier in this dictation, the  location of the excisional biopsy I think is more consistent with an inferior level V lymph node than a classic supraclavicular lymph node, and this again would make skin cancer much more likely.  To have a head and neck cancer metastasize to this region without other lymphadenopathy would be distinctly unlikely.  Just because this squamous cell carcinoma was p16 positive, does not mean that it is related to an  oropharynx primary.       I have gone ahead and requested that HPV 16 and HPV 18 testing be performed on the April 13 biopsy.  I suspect that the  HPV 16 and HPV 18 testing will be negative and this testing  should be much more accurate than relying on the surrogate p16 status.        [facility] [place] [facility], and I suspect that the two larger scars next to the left neck excisional biopsy scars will prove to be squamous cell carcinoma.  I had planned on obtaining a diagnostic CT of the neck and chest with IV contrast; however, the EGFR that I obtained today was only 48, and it is not worth risking the patient's kidney function and I have canceled the diagnostic CT scan.  The patient did have a good PET scan that was completely negative, except for the site of the excisional biopsy.       The patient will be seeing Dr. Yris Alfred on May 11.  Assuming Dr. Alfred is in agreement, I am recommending radiation treatment to the site surrounding the left inferior level V excisional biopsy site. I probably will also include the two immediately adjacent scars from skin cancers, although this decision will be partly predicated on the information I get from the [facility].  The pacemaker is relatively nearby, but we should be able to keep the pacemaker to a low dose.  I am considering a postoperative dose of approximately 60 to 64 Gy delivered in six to six and a half weeks at 2 Gy per fraction.  I think there is an excellent chance for permanent control.  The patient  and his daughter who participated via telephone were in agreement with this strategy.        Assessment:   Metastatic squamous cell carcinoma to left level 5 with a history of multiple squamous cell carcinomas of the face,scalp and skin of shoulders.  Carcinoma of the oropharynx does not typically metastasize to level 5.  Lesions that most commonly metastasize to level 5 of those involving the nasopharynx or scalp.  Therefore it is most likely that the metastatic lesion is a result of a previous squamous cell carcinoma of the skin    Plan:   Stains for HPV 16 and 18 are pending  I will get a copy of the previous path results from the most recent skin cancer removals for my review   Case was discussed with Dr. Porter

## 2023-05-11 ENCOUNTER — OFFICE VISIT (OUTPATIENT)
Dept: HEMATOLOGY/ONCOLOGY | Facility: CLINIC | Age: 78
End: 2023-05-11
Payer: MEDICARE

## 2023-05-11 VITALS
HEIGHT: 72 IN | RESPIRATION RATE: 18 BRPM | DIASTOLIC BLOOD PRESSURE: 79 MMHG | WEIGHT: 191.56 LBS | BODY MASS INDEX: 25.95 KG/M2 | HEART RATE: 81 BPM | SYSTOLIC BLOOD PRESSURE: 126 MMHG | OXYGEN SATURATION: 95 % | TEMPERATURE: 97 F

## 2023-05-11 DIAGNOSIS — Z85.828 HISTORY OF SKIN CANCER: ICD-10-CM

## 2023-05-11 DIAGNOSIS — C44.42 SQUAMOUS CELL CARCINOMA OF NECK: ICD-10-CM

## 2023-05-11 PROCEDURE — 1101F PT FALLS ASSESS-DOCD LE1/YR: CPT | Mod: CPTII,S$GLB,, | Performed by: OTOLARYNGOLOGY

## 2023-05-11 PROCEDURE — 1160F PR REVIEW ALL MEDS BY PRESCRIBER/CLIN PHARMACIST DOCUMENTED: ICD-10-PCS | Mod: CPTII,S$GLB,, | Performed by: OTOLARYNGOLOGY

## 2023-05-11 PROCEDURE — 3078F DIAST BP <80 MM HG: CPT | Mod: CPTII,S$GLB,, | Performed by: OTOLARYNGOLOGY

## 2023-05-11 PROCEDURE — 99999 PR PBB SHADOW E&M-EST. PATIENT-LVL V: ICD-10-PCS | Mod: PBBFAC,,, | Performed by: OTOLARYNGOLOGY

## 2023-05-11 PROCEDURE — 3078F PR MOST RECENT DIASTOLIC BLOOD PRESSURE < 80 MM HG: ICD-10-PCS | Mod: CPTII,S$GLB,, | Performed by: OTOLARYNGOLOGY

## 2023-05-11 PROCEDURE — 3074F SYST BP LT 130 MM HG: CPT | Mod: CPTII,S$GLB,, | Performed by: OTOLARYNGOLOGY

## 2023-05-11 PROCEDURE — 1160F RVW MEDS BY RX/DR IN RCRD: CPT | Mod: CPTII,S$GLB,, | Performed by: OTOLARYNGOLOGY

## 2023-05-11 PROCEDURE — 1126F PR PAIN SEVERITY QUANTIFIED, NO PAIN PRESENT: ICD-10-PCS | Mod: CPTII,S$GLB,, | Performed by: OTOLARYNGOLOGY

## 2023-05-11 PROCEDURE — 3288F FALL RISK ASSESSMENT DOCD: CPT | Mod: CPTII,S$GLB,, | Performed by: OTOLARYNGOLOGY

## 2023-05-11 PROCEDURE — 99205 PR OFFICE/OUTPT VISIT, NEW, LEVL V, 60-74 MIN: ICD-10-PCS | Mod: S$GLB,,, | Performed by: OTOLARYNGOLOGY

## 2023-05-11 PROCEDURE — 1159F MED LIST DOCD IN RCRD: CPT | Mod: CPTII,S$GLB,, | Performed by: OTOLARYNGOLOGY

## 2023-05-11 PROCEDURE — 3288F PR FALLS RISK ASSESSMENT DOCUMENTED: ICD-10-PCS | Mod: CPTII,S$GLB,, | Performed by: OTOLARYNGOLOGY

## 2023-05-11 PROCEDURE — 1101F PR PT FALLS ASSESS DOC 0-1 FALLS W/OUT INJ PAST YR: ICD-10-PCS | Mod: CPTII,S$GLB,, | Performed by: OTOLARYNGOLOGY

## 2023-05-11 PROCEDURE — 99999 PR PBB SHADOW E&M-EST. PATIENT-LVL V: CPT | Mod: PBBFAC,,, | Performed by: OTOLARYNGOLOGY

## 2023-05-11 PROCEDURE — 1126F AMNT PAIN NOTED NONE PRSNT: CPT | Mod: CPTII,S$GLB,, | Performed by: OTOLARYNGOLOGY

## 2023-05-11 PROCEDURE — 3074F PR MOST RECENT SYSTOLIC BLOOD PRESSURE < 130 MM HG: ICD-10-PCS | Mod: CPTII,S$GLB,, | Performed by: OTOLARYNGOLOGY

## 2023-05-11 PROCEDURE — 99205 OFFICE O/P NEW HI 60 MIN: CPT | Mod: S$GLB,,, | Performed by: OTOLARYNGOLOGY

## 2023-05-11 PROCEDURE — 1159F PR MEDICATION LIST DOCUMENTED IN MEDICAL RECORD: ICD-10-PCS | Mod: CPTII,S$GLB,, | Performed by: OTOLARYNGOLOGY

## 2023-05-11 RX ORDER — TEMAZEPAM 15 MG/1
15 CAPSULE ORAL NIGHTLY
COMMUNITY
Start: 2023-04-24

## 2023-05-30 ENCOUNTER — LAB VISIT (OUTPATIENT)
Dept: PRIMARY CARE CLINIC | Facility: CLINIC | Age: 78
End: 2023-05-30
Payer: MEDICARE

## 2023-05-30 DIAGNOSIS — E11.22 TYPE 2 DIABETES MELLITUS WITH STAGE 3B CHRONIC KIDNEY DISEASE, WITHOUT LONG-TERM CURRENT USE OF INSULIN: ICD-10-CM

## 2023-05-30 DIAGNOSIS — N18.32 TYPE 2 DIABETES MELLITUS WITH STAGE 3B CHRONIC KIDNEY DISEASE, WITHOUT LONG-TERM CURRENT USE OF INSULIN: ICD-10-CM

## 2023-05-30 LAB
ALBUMIN SERPL BCP-MCNC: 4.1 G/DL (ref 3.5–5.2)
ALP SERPL-CCNC: 83 U/L (ref 55–135)
ALT SERPL W/O P-5'-P-CCNC: 13 U/L (ref 10–44)
ANION GAP SERPL CALC-SCNC: 9 MMOL/L (ref 8–16)
AST SERPL-CCNC: 21 U/L (ref 10–40)
BILIRUB SERPL-MCNC: 0.9 MG/DL (ref 0.1–1)
BUN SERPL-MCNC: 23 MG/DL (ref 8–23)
CALCIUM SERPL-MCNC: 9.6 MG/DL (ref 8.7–10.5)
CHLORIDE SERPL-SCNC: 103 MMOL/L (ref 95–110)
CO2 SERPL-SCNC: 24 MMOL/L (ref 23–29)
CREAT SERPL-MCNC: 1.3 MG/DL (ref 0.5–1.4)
EST. GFR  (NO RACE VARIABLE): 55.9 ML/MIN/1.73 M^2
ESTIMATED AVG GLUCOSE: 160 MG/DL (ref 68–131)
GLUCOSE SERPL-MCNC: 173 MG/DL (ref 70–110)
HBA1C MFR BLD: 7.2 % (ref 4–5.6)
POTASSIUM SERPL-SCNC: 5.1 MMOL/L (ref 3.5–5.1)
PROT SERPL-MCNC: 6.9 G/DL (ref 6–8.4)
SODIUM SERPL-SCNC: 136 MMOL/L (ref 136–145)

## 2023-05-30 PROCEDURE — 80053 COMPREHEN METABOLIC PANEL: CPT | Performed by: NURSE PRACTITIONER

## 2023-05-30 PROCEDURE — 83036 HEMOGLOBIN GLYCOSYLATED A1C: CPT | Performed by: NURSE PRACTITIONER

## 2023-06-05 ENCOUNTER — OFFICE VISIT (OUTPATIENT)
Dept: ENDOCRINOLOGY | Facility: CLINIC | Age: 78
End: 2023-06-05
Payer: MEDICARE

## 2023-06-05 VITALS
HEIGHT: 72 IN | SYSTOLIC BLOOD PRESSURE: 120 MMHG | WEIGHT: 192.25 LBS | DIASTOLIC BLOOD PRESSURE: 60 MMHG | BODY MASS INDEX: 26.04 KG/M2 | HEART RATE: 86 BPM

## 2023-06-05 DIAGNOSIS — E78.2 MIXED HYPERLIPIDEMIA: ICD-10-CM

## 2023-06-05 DIAGNOSIS — E11.22 TYPE 2 DIABETES MELLITUS WITH STAGE 3B CHRONIC KIDNEY DISEASE, WITHOUT LONG-TERM CURRENT USE OF INSULIN: Primary | ICD-10-CM

## 2023-06-05 DIAGNOSIS — I25.10 CORONARY ARTERY DISEASE INVOLVING NATIVE CORONARY ARTERY OF NATIVE HEART WITHOUT ANGINA PECTORIS: ICD-10-CM

## 2023-06-05 DIAGNOSIS — N18.32 TYPE 2 DIABETES MELLITUS WITH STAGE 3B CHRONIC KIDNEY DISEASE, WITHOUT LONG-TERM CURRENT USE OF INSULIN: Primary | ICD-10-CM

## 2023-06-05 PROCEDURE — 3288F PR FALLS RISK ASSESSMENT DOCUMENTED: ICD-10-PCS | Mod: CPTII,S$GLB,, | Performed by: NURSE PRACTITIONER

## 2023-06-05 PROCEDURE — 3074F PR MOST RECENT SYSTOLIC BLOOD PRESSURE < 130 MM HG: ICD-10-PCS | Mod: CPTII,S$GLB,, | Performed by: NURSE PRACTITIONER

## 2023-06-05 PROCEDURE — 99214 PR OFFICE/OUTPT VISIT, EST, LEVL IV, 30-39 MIN: ICD-10-PCS | Mod: S$GLB,,, | Performed by: NURSE PRACTITIONER

## 2023-06-05 PROCEDURE — 99214 OFFICE O/P EST MOD 30 MIN: CPT | Mod: S$GLB,,, | Performed by: NURSE PRACTITIONER

## 2023-06-05 PROCEDURE — 1159F MED LIST DOCD IN RCRD: CPT | Mod: CPTII,S$GLB,, | Performed by: NURSE PRACTITIONER

## 2023-06-05 PROCEDURE — 1159F PR MEDICATION LIST DOCUMENTED IN MEDICAL RECORD: ICD-10-PCS | Mod: CPTII,S$GLB,, | Performed by: NURSE PRACTITIONER

## 2023-06-05 PROCEDURE — 1126F PR PAIN SEVERITY QUANTIFIED, NO PAIN PRESENT: ICD-10-PCS | Mod: CPTII,S$GLB,, | Performed by: NURSE PRACTITIONER

## 2023-06-05 PROCEDURE — 3078F DIAST BP <80 MM HG: CPT | Mod: CPTII,S$GLB,, | Performed by: NURSE PRACTITIONER

## 2023-06-05 PROCEDURE — 99999 PR PBB SHADOW E&M-EST. PATIENT-LVL IV: CPT | Mod: PBBFAC,,, | Performed by: NURSE PRACTITIONER

## 2023-06-05 PROCEDURE — 3078F PR MOST RECENT DIASTOLIC BLOOD PRESSURE < 80 MM HG: ICD-10-PCS | Mod: CPTII,S$GLB,, | Performed by: NURSE PRACTITIONER

## 2023-06-05 PROCEDURE — 1160F PR REVIEW ALL MEDS BY PRESCRIBER/CLIN PHARMACIST DOCUMENTED: ICD-10-PCS | Mod: CPTII,S$GLB,, | Performed by: NURSE PRACTITIONER

## 2023-06-05 PROCEDURE — 1160F RVW MEDS BY RX/DR IN RCRD: CPT | Mod: CPTII,S$GLB,, | Performed by: NURSE PRACTITIONER

## 2023-06-05 PROCEDURE — 3074F SYST BP LT 130 MM HG: CPT | Mod: CPTII,S$GLB,, | Performed by: NURSE PRACTITIONER

## 2023-06-05 PROCEDURE — 99999 PR PBB SHADOW E&M-EST. PATIENT-LVL IV: ICD-10-PCS | Mod: PBBFAC,,, | Performed by: NURSE PRACTITIONER

## 2023-06-05 PROCEDURE — 1101F PR PT FALLS ASSESS DOC 0-1 FALLS W/OUT INJ PAST YR: ICD-10-PCS | Mod: CPTII,S$GLB,, | Performed by: NURSE PRACTITIONER

## 2023-06-05 PROCEDURE — 3288F FALL RISK ASSESSMENT DOCD: CPT | Mod: CPTII,S$GLB,, | Performed by: NURSE PRACTITIONER

## 2023-06-05 PROCEDURE — 1101F PT FALLS ASSESS-DOCD LE1/YR: CPT | Mod: CPTII,S$GLB,, | Performed by: NURSE PRACTITIONER

## 2023-06-05 PROCEDURE — 1126F AMNT PAIN NOTED NONE PRSNT: CPT | Mod: CPTII,S$GLB,, | Performed by: NURSE PRACTITIONER

## 2023-06-05 RX ORDER — GLIMEPIRIDE 2 MG/1
TABLET ORAL
Qty: 270 TABLET | Refills: 3 | Status: SHIPPED | OUTPATIENT
Start: 2023-06-05 | End: 2023-10-04

## 2023-06-05 NOTE — PROGRESS NOTES
CC: Mr. Felix Sheridan arrives today for management of Type 2 DM and review of chronic medical conditions, as listed in the visit diagnosis section of this encounter.     HPI: Mr. Felix Sheridan was diagnosed with Type 2 DM in 1974. He was diagnosed based on lab work. Initial treatment consisted of metformin. This was later stopped, due to renal function. + FH of DM in mother, brothers. Denies hospitalizations due to DM.   History of renal cell carcinoma status post partial nephrectomy years ago. He follows with nephrology (Dr. Cowan).  Patient has CAD with multiple past PCI. His cardiologist is Dr. Dykes.     Patient was last seen by me in January.     Receiving radiation treatment at [facility] for squamous cell carcinoma to neck.     He states that he sometimes takes an additional 0.5 - 1 tablet if fasting blood sugar is elevated.     BG readings are checked 2x/day.               Hypoglycemia: Rare   Hypoglycemic Symptoms:  nausea  Hypoglycemia Treatment: Coke    Missing Insulin/PO medication doses: No    Exercise: walking 2 miles 5 days/week    Dietary Habits: Eats 3 meals/day. Rare snack.  Avoids sugary beverages.         CURRENT DIABETIC MEDS: glimepiride 2 mg BID with meals  Glucometer type: Accucheck Guide    Previous DM treatments:  Metformin - discontinued due to renal function  Jardiance - discontinued due to renal function  Tradjenta - cost  Pioglitazone - BLE swelling  Glipizide     Last Eye Exam: 2022, No DR, per pt  Last Podiatry Exam: n/a    REVIEW OF SYSTEMS  Constitutional: no c/o fatigue, weakness, weight loss.   Eyes: denies visual disturbances.  Cardiac: no palpitations or chest pain.  Respiratory: no cough or dyspnea.  GI: no c/o abdominal pain or nausea  Skin: + blistering to radiation site  Neuro: no numbness, tingling, or parasthesias.  Endocrine: denies polyphagia, polydipsia, polyuria      Personally reviewed Past Medical, Surgical, Social History.    Vital Signs  /60    Pulse 86   Ht 6' (1.829 m)   Wt 87.2 kg (192 lb 3.9 oz)   BMI 26.07 kg/m²      Personally reviewed the below labs:    External labs dated 1/5/2023:          Hemoglobin A1C   Date Value Ref Range Status   05/30/2023 7.2 (H) 4.0 - 5.6 % Final     Comment:     ADA Screening Guidelines:  5.7-6.4%  Consistent with prediabetes  >or=6.5%  Consistent with diabetes    High levels of fetal hemoglobin interfere with the HbA1C  assay. Heterozygous hemoglobin variants (HbS, HgC, etc)do  not significantly interfere with this assay.   However, presence of multiple variants may affect accuracy.     01/04/2023 7.7 (H) 4.0 - 5.6 % Final     Comment:     ADA Screening Guidelines:  5.7-6.4%  Consistent with prediabetes  >or=6.5%  Consistent with diabetes    High levels of fetal hemoglobin interfere with the HbA1C  assay. Heterozygous hemoglobin variants (HbS, HgC, etc)do  not significantly interfere with this assay.   However, presence of multiple variants may affect accuracy.     08/22/2022 7.2 (H) 4.0 - 5.6 % Final     Comment:     ADA Screening Guidelines:  5.7-6.4%  Consistent with prediabetes  >or=6.5%  Consistent with diabetes    High levels of fetal hemoglobin interfere with the HbA1C  assay. Heterozygous hemoglobin variants (HbS, HgC, etc)do  not significantly interfere with this assay.   However, presence of multiple variants may affect accuracy.         Chemistry        Component Value Date/Time     05/30/2023 0847    K 5.1 05/30/2023 0847     05/30/2023 0847    CO2 24 05/30/2023 0847    BUN 23 05/30/2023 0847    CREATININE 1.3 05/30/2023 0847     (H) 05/30/2023 0847        Component Value Date/Time    CALCIUM 9.6 05/30/2023 0847    ALKPHOS 83 05/30/2023 0847    AST 21 05/30/2023 0847    ALT 13 05/30/2023 0847    BILITOT 0.9 05/30/2023 0847    ESTGFRAFRICA 50.8 (A) 05/16/2022 1031    EGFRNONAA 44.0 (A) 05/16/2022 1031          Lab Results   Component Value Date    CHOL 134 01/04/2023    CHOL 126  09/10/2021     Lab Results   Component Value Date    HDL 44 01/04/2023    HDL 47 09/10/2021     Lab Results   Component Value Date    LDLCALC 65.0 01/04/2023    LDLCALC 63.8 09/10/2021     Lab Results   Component Value Date    TRIG 125 01/04/2023    TRIG 76 09/10/2021     Lab Results   Component Value Date    CHOLHDL 32.8 01/04/2023    CHOLHDL 37.3 09/10/2021       Lab Results   Component Value Date    MICALBCREAT 37.5 (H) 08/22/2022     No results found for: TSH    Estimated Creatinine Clearance: 50.6 mL/min (based on SCr of 1.3 mg/dL).    No results found for: [id number]      PHYSICAL EXAMINATION  Constitutional: Appears well, no distress  Respiratory: CTA, even and unlabored.  Cardiovascular: RRR, no murmurs, no carotid bruits.  GI: active bowel sounds, no hernia noted.  Skin: warm and dry  Neuro: oriented to person, place, time  Feet: appropriate footwear.       Goals        HEMOGLOBIN A1C < 8               Assessment/Plan  1. Type 2 diabetes mellitus with stage 3b chronic kidney disease, without long-term current use of insulin -- A1c is stable without hyperglycemia. Cannot use certain oral agents due to renal function and cost.   -- continue glimepiride 2 mg BID with meals. However, may increase dose by 0.5 - 1 tab if BG >150 at that time.   -- BG monitoring 1x/day, alternating times. Advised him to notify me if having any hypoglycemia.   -- recommended eye exam.     -- Reviewed hypoglycemia management: treat with 4 oz of juice, 4 oz regular soda, or 4 glucose tablets. Monitor and repeat treatment every 15 minutes until BG is >70 Then have a snack, which includes 15 grams of complex carbohydrates and protein.   Advised patient to check BG before activities, such as driving or exercise.  -- takes statin, ARB, aspirin   2. Coronary artery disease involving native coronary artery of native heart without angina pectoris  -- stable  -- follows with cardiology   3. Mixed hyperlipidemia  -- controlled  -- continue  pravastatin         FOLLOW UP  Follow up in about 6 months (around 12/5/2023).   Patient instructed to bring BG logs to each follow up   Patient encouraged to call for any BG/medication issues, concerns, or questions.    Orders Placed This Encounter   Procedures    Hemoglobin A1C    Microalbumin/Creatinine Ratio, Urine    Comprehensive Metabolic Panel

## 2023-06-06 ENCOUNTER — PATIENT MESSAGE (OUTPATIENT)
Dept: DERMATOLOGY | Facility: CLINIC | Age: 78
End: 2023-06-06
Payer: MEDICARE

## 2023-08-23 ENCOUNTER — TELEPHONE (OUTPATIENT)
Dept: ENDOCRINOLOGY | Facility: CLINIC | Age: 78
End: 2023-08-23
Payer: MEDICARE

## 2023-08-23 NOTE — TELEPHONE ENCOUNTER
Received pt's glucose logs and external labs. A1c was 7.2% at last visit in June. It has now increased to 8.3%. I'd recommend adding another medication, likely Jardiance (eGFR 53 and creatinine 1.35) or long acting insulin. Please book an appt to discuss. Can do virtual if he'd like. Can keep December appt as is.     I would still recommend seeing endocrinologist for the low sodium, as this is a persistent finding on his lab.

## 2023-08-23 NOTE — TELEPHONE ENCOUNTER
Spoke with patient and notified him of Jumana's previous message and verbalized understanding   Pt scheduled for 11:30 for next Tuesday     Pt verb understanding

## 2023-08-29 ENCOUNTER — OFFICE VISIT (OUTPATIENT)
Dept: ENDOCRINOLOGY | Facility: CLINIC | Age: 78
End: 2023-08-29
Payer: MEDICARE

## 2023-08-29 VITALS
WEIGHT: 190.25 LBS | DIASTOLIC BLOOD PRESSURE: 60 MMHG | HEART RATE: 79 BPM | HEIGHT: 72 IN | BODY MASS INDEX: 25.77 KG/M2 | SYSTOLIC BLOOD PRESSURE: 122 MMHG

## 2023-08-29 DIAGNOSIS — I25.10 CORONARY ARTERY DISEASE INVOLVING NATIVE CORONARY ARTERY OF NATIVE HEART WITHOUT ANGINA PECTORIS: ICD-10-CM

## 2023-08-29 DIAGNOSIS — E78.2 MIXED HYPERLIPIDEMIA: ICD-10-CM

## 2023-08-29 DIAGNOSIS — N18.31 TYPE 2 DIABETES MELLITUS WITH STAGE 3A CHRONIC KIDNEY DISEASE, WITHOUT LONG-TERM CURRENT USE OF INSULIN: Primary | ICD-10-CM

## 2023-08-29 DIAGNOSIS — E11.22 TYPE 2 DIABETES MELLITUS WITH STAGE 3A CHRONIC KIDNEY DISEASE, WITHOUT LONG-TERM CURRENT USE OF INSULIN: Primary | ICD-10-CM

## 2023-08-29 PROCEDURE — 3078F PR MOST RECENT DIASTOLIC BLOOD PRESSURE < 80 MM HG: ICD-10-PCS | Mod: CPTII,S$GLB,, | Performed by: NURSE PRACTITIONER

## 2023-08-29 PROCEDURE — 99214 PR OFFICE/OUTPT VISIT, EST, LEVL IV, 30-39 MIN: ICD-10-PCS | Mod: S$GLB,,, | Performed by: NURSE PRACTITIONER

## 2023-08-29 PROCEDURE — 3074F SYST BP LT 130 MM HG: CPT | Mod: CPTII,S$GLB,, | Performed by: NURSE PRACTITIONER

## 2023-08-29 PROCEDURE — 1160F RVW MEDS BY RX/DR IN RCRD: CPT | Mod: CPTII,S$GLB,, | Performed by: NURSE PRACTITIONER

## 2023-08-29 PROCEDURE — 1126F PR PAIN SEVERITY QUANTIFIED, NO PAIN PRESENT: ICD-10-PCS | Mod: CPTII,S$GLB,, | Performed by: NURSE PRACTITIONER

## 2023-08-29 PROCEDURE — 99999 PR PBB SHADOW E&M-EST. PATIENT-LVL IV: CPT | Mod: PBBFAC,,, | Performed by: NURSE PRACTITIONER

## 2023-08-29 PROCEDURE — 3078F DIAST BP <80 MM HG: CPT | Mod: CPTII,S$GLB,, | Performed by: NURSE PRACTITIONER

## 2023-08-29 PROCEDURE — 1159F PR MEDICATION LIST DOCUMENTED IN MEDICAL RECORD: ICD-10-PCS | Mod: CPTII,S$GLB,, | Performed by: NURSE PRACTITIONER

## 2023-08-29 PROCEDURE — 1126F AMNT PAIN NOTED NONE PRSNT: CPT | Mod: CPTII,S$GLB,, | Performed by: NURSE PRACTITIONER

## 2023-08-29 PROCEDURE — 99999 PR PBB SHADOW E&M-EST. PATIENT-LVL IV: ICD-10-PCS | Mod: PBBFAC,,, | Performed by: NURSE PRACTITIONER

## 2023-08-29 PROCEDURE — 3074F PR MOST RECENT SYSTOLIC BLOOD PRESSURE < 130 MM HG: ICD-10-PCS | Mod: CPTII,S$GLB,, | Performed by: NURSE PRACTITIONER

## 2023-08-29 PROCEDURE — 3288F FALL RISK ASSESSMENT DOCD: CPT | Mod: CPTII,S$GLB,, | Performed by: NURSE PRACTITIONER

## 2023-08-29 PROCEDURE — 1159F MED LIST DOCD IN RCRD: CPT | Mod: CPTII,S$GLB,, | Performed by: NURSE PRACTITIONER

## 2023-08-29 PROCEDURE — 99214 OFFICE O/P EST MOD 30 MIN: CPT | Mod: S$GLB,,, | Performed by: NURSE PRACTITIONER

## 2023-08-29 PROCEDURE — 1160F PR REVIEW ALL MEDS BY PRESCRIBER/CLIN PHARMACIST DOCUMENTED: ICD-10-PCS | Mod: CPTII,S$GLB,, | Performed by: NURSE PRACTITIONER

## 2023-08-29 PROCEDURE — 3288F PR FALLS RISK ASSESSMENT DOCUMENTED: ICD-10-PCS | Mod: CPTII,S$GLB,, | Performed by: NURSE PRACTITIONER

## 2023-08-29 PROCEDURE — 1101F PR PT FALLS ASSESS DOC 0-1 FALLS W/OUT INJ PAST YR: ICD-10-PCS | Mod: CPTII,S$GLB,, | Performed by: NURSE PRACTITIONER

## 2023-08-29 PROCEDURE — 1101F PT FALLS ASSESS-DOCD LE1/YR: CPT | Mod: CPTII,S$GLB,, | Performed by: NURSE PRACTITIONER

## 2023-08-29 NOTE — PROGRESS NOTES
CC: Mr. Felix Sheridan arrives today for management of Type 2 DM and review of chronic medical conditions, as listed in the visit diagnosis section of this encounter.     HPI: Mr. Felix Sheridan was diagnosed with Type 2 DM in 1974. He was diagnosed based on lab work. Initial treatment consisted of metformin. This was later stopped, due to renal function. + FH of DM in mother, brothers. Denies hospitalizations due to DM.   History of renal cell carcinoma status post partial nephrectomy years ago. He follows with nephrology (Dr. Cowan).  Patient has CAD with multiple past PCI. His cardiologist is Dr. Dykes.     Patient was last seen by me in June.     A1c has increased to 8.3% on external labs.     He has completed radiation treatment at [facility] for squamous cell carcinoma to neck.     He sometimes takes as much as 4-6 glimepiride tablets in an effort to reduce glucose. He had not seen an improvement.     BG readings are checked 2x/day.           Hypoglycemia: Rare   Hypoglycemic Symptoms:  nausea  Hypoglycemia Treatment: Coke    Missing Insulin/PO medication doses: No    Exercise: has not been walking lately. Has been caring for wife, who has memory issues.     Dietary Habits: Eats 3 meals/day. Rare snack.  Avoids sugary beverages.         CURRENT DIABETIC MEDS: glimepiride 2-4 mg BID with meals  Glucometer type: Accucheck Guide    Previous DM treatments:  Metformin - discontinued due to renal function  Jardiance 25 mg - discontinued due to renal function  Tradjenta - cost  Pioglitazone - BLE swelling  Glipizide     Last Eye Exam: 2022, No DR, per pt  Last Podiatry Exam: n/a    REVIEW OF SYSTEMS  Constitutional: no c/o fatigue, weakness, weight loss.   Cardiac: no palpitations or chest pain.  Respiratory: no cough or dyspnea.  GI: no c/o abdominal pain or nausea. Denies h/o pancreatitis.   Skin: no wounds, rashes  Neuro: no numbness, tingling, or parasthesias.  Endocrine: denies polyphagia, polydipsia,  polyuria      Personally reviewed Past Medical, Surgical, Social History.    Vital Signs  /60   Pulse 79   Ht 6' (1.829 m)   Wt 86.3 kg (190 lb 4.1 oz)   BMI 25.80 kg/m²      Personally reviewed the below labs:    External labs dated 8/8/2023:              External labs dated 1/5/2023:          Hemoglobin A1C   Date Value Ref Range Status   05/30/2023 7.2 (H) 4.0 - 5.6 % Final     Comment:     ADA Screening Guidelines:  5.7-6.4%  Consistent with prediabetes  >or=6.5%  Consistent with diabetes    High levels of fetal hemoglobin interfere with the HbA1C  assay. Heterozygous hemoglobin variants (HbS, HgC, etc)do  not significantly interfere with this assay.   However, presence of multiple variants may affect accuracy.     01/04/2023 7.7 (H) 4.0 - 5.6 % Final     Comment:     ADA Screening Guidelines:  5.7-6.4%  Consistent with prediabetes  >or=6.5%  Consistent with diabetes    High levels of fetal hemoglobin interfere with the HbA1C  assay. Heterozygous hemoglobin variants (HbS, HgC, etc)do  not significantly interfere with this assay.   However, presence of multiple variants may affect accuracy.     08/22/2022 7.2 (H) 4.0 - 5.6 % Final     Comment:     ADA Screening Guidelines:  5.7-6.4%  Consistent with prediabetes  >or=6.5%  Consistent with diabetes    High levels of fetal hemoglobin interfere with the HbA1C  assay. Heterozygous hemoglobin variants (HbS, HgC, etc)do  not significantly interfere with this assay.   However, presence of multiple variants may affect accuracy.         Chemistry        Component Value Date/Time     05/30/2023 0847    K 5.1 05/30/2023 0847     05/30/2023 0847    CO2 24 05/30/2023 0847    BUN 23 05/30/2023 0847    CREATININE 1.3 05/30/2023 0847     (H) 05/30/2023 0847        Component Value Date/Time    CALCIUM 9.6 05/30/2023 0847    ALKPHOS 83 05/30/2023 0847    AST 21 05/30/2023 0847    ALT 13 05/30/2023 0847    BILITOT 0.9 05/30/2023 0847    ESTGFRAFRICA 50.8  "(A) 05/16/2022 1031    EGFRNONAA 44.0 (A) 05/16/2022 1031          Lab Results   Component Value Date    CHOL 134 01/04/2023    CHOL 126 09/10/2021     Lab Results   Component Value Date    HDL 44 01/04/2023    HDL 47 09/10/2021     Lab Results   Component Value Date    LDLCALC 65.0 01/04/2023    LDLCALC 63.8 09/10/2021     Lab Results   Component Value Date    TRIG 125 01/04/2023    TRIG 76 09/10/2021     Lab Results   Component Value Date    CHOLHDL 32.8 01/04/2023    CHOLHDL 37.3 09/10/2021       Lab Results   Component Value Date    MICALBCREAT 37.5 (H) 08/22/2022     No results found for: "TSH"    CrCl cannot be calculated (Patient's most recent lab result is older than the maximum 7 days allowed.).    No results found for: "[id number]"      PHYSICAL EXAMINATION  Deferred        Goals        HEMOGLOBIN A1C < 8               Assessment/Plan  1. Type 2 diabetes mellitus with stage 3b chronic kidney disease, without long-term current use of insulin -- Worsening. Previously tolerated Jardiance well but this was discontinued when eGFR was 38. eGFR has been 53-55 over the past 3 months.   -- resume Jardiance 10 mg daily. Discussed medication's mechanism of action, side effects, and contraindications.   May need PAP if expensive. Renal function panel in 6 weeks.  -- continue glimepiride 2 mg with breakfast, 4 mg with dinner.   -- discussed the possibility of basal insulin.   -- BG monitoring 2x/day, alternating times. Advised him to notify me if having any hypoglycemia.   -- recommended eye exam.     -- Reviewed hypoglycemia management: treat with 4 oz of juice, 4 oz regular soda, or 4 glucose tablets. Monitor and repeat treatment every 15 minutes until BG is >70 Then have a snack, which includes 15 grams of complex carbohydrates and protein.   Advised patient to check BG before activities, such as driving or exercise.  -- takes statin, ARB, aspirin   2. Coronary artery disease involving native coronary artery of " native heart without angina pectoris  -- stable  -- follows with cardiology   3. Mixed hyperlipidemia  -- controlled  -- continue pravastatin         FOLLOW UP  Follow up in about 3 months (around 11/29/2023).   Patient instructed to bring BG logs to each follow up   Patient encouraged to call for any BG/medication issues, concerns, or questions.    Orders Placed This Encounter   Procedures    Renal Function Panel

## 2023-08-29 NOTE — PATIENT INSTRUCTIONS
Start Jardiance 10 mg every morning.     Decrease glimepiride to 1 tablet with breakfast and 2 tablets with dinner.

## 2023-09-28 ENCOUNTER — TELEPHONE (OUTPATIENT)
Dept: ENDOCRINOLOGY | Facility: CLINIC | Age: 78
End: 2023-09-28
Payer: MEDICARE

## 2023-09-28 NOTE — TELEPHONE ENCOUNTER
----- Message from Tre Galdamez sent at 9/28/2023  9:36 AM CDT -----  Type: Needs Medical Advice  Who Called:  Patient    Pharmacy name and phone #:    [facility] - [address] [address]  Phone: [phone] Fax: [phone]          Best Call Back Number: [phone]  Additional Information: Patient states that he would like a callback regarding not being able to take his medication due to affecting his kidney function:    empagliflozin (JARDIANCE) 10 mg tablet

## 2023-10-03 ENCOUNTER — LAB VISIT (OUTPATIENT)
Dept: PRIMARY CARE CLINIC | Facility: CLINIC | Age: 78
End: 2023-10-03
Payer: MEDICARE

## 2023-10-03 DIAGNOSIS — E11.22 TYPE 2 DIABETES MELLITUS WITH STAGE 3A CHRONIC KIDNEY DISEASE, WITHOUT LONG-TERM CURRENT USE OF INSULIN: ICD-10-CM

## 2023-10-03 DIAGNOSIS — N18.32 TYPE 2 DIABETES MELLITUS WITH STAGE 3B CHRONIC KIDNEY DISEASE, WITHOUT LONG-TERM CURRENT USE OF INSULIN: ICD-10-CM

## 2023-10-03 DIAGNOSIS — N18.31 TYPE 2 DIABETES MELLITUS WITH STAGE 3A CHRONIC KIDNEY DISEASE, WITHOUT LONG-TERM CURRENT USE OF INSULIN: Primary | ICD-10-CM

## 2023-10-03 DIAGNOSIS — N18.31 TYPE 2 DIABETES MELLITUS WITH STAGE 3A CHRONIC KIDNEY DISEASE, WITHOUT LONG-TERM CURRENT USE OF INSULIN: ICD-10-CM

## 2023-10-03 DIAGNOSIS — E11.22 TYPE 2 DIABETES MELLITUS WITH STAGE 3B CHRONIC KIDNEY DISEASE, WITHOUT LONG-TERM CURRENT USE OF INSULIN: ICD-10-CM

## 2023-10-03 DIAGNOSIS — E11.22 TYPE 2 DIABETES MELLITUS WITH STAGE 3A CHRONIC KIDNEY DISEASE, WITHOUT LONG-TERM CURRENT USE OF INSULIN: Primary | ICD-10-CM

## 2023-10-03 LAB
ALBUMIN SERPL BCP-MCNC: 3.9 G/DL (ref 3.5–5.2)
ANION GAP SERPL CALC-SCNC: 10 MMOL/L (ref 8–16)
BUN SERPL-MCNC: 18 MG/DL (ref 8–23)
CALCIUM SERPL-MCNC: 9.2 MG/DL (ref 8.7–10.5)
CHLORIDE SERPL-SCNC: 94 MMOL/L (ref 95–110)
CO2 SERPL-SCNC: 23 MMOL/L (ref 23–29)
CREAT SERPL-MCNC: 1.6 MG/DL (ref 0.5–1.4)
EST. GFR  (NO RACE VARIABLE): 43.3 ML/MIN/1.73 M^2
GLUCOSE SERPL-MCNC: 195 MG/DL (ref 70–110)
PHOSPHATE SERPL-MCNC: 2.6 MG/DL (ref 2.7–4.5)
POTASSIUM SERPL-SCNC: 5.2 MMOL/L (ref 3.5–5.1)
SODIUM SERPL-SCNC: 127 MMOL/L (ref 136–145)

## 2023-10-03 PROCEDURE — 36415 COLL VENOUS BLD VENIPUNCTURE: CPT | Mod: S$GLB,,, | Performed by: NURSE PRACTITIONER

## 2023-10-03 PROCEDURE — 36415 PR COLLECTION VENOUS BLOOD,VENIPUNCTURE: ICD-10-PCS | Mod: S$GLB,,, | Performed by: NURSE PRACTITIONER

## 2023-10-03 PROCEDURE — 80069 RENAL FUNCTION PANEL: CPT | Performed by: NURSE PRACTITIONER

## 2023-10-03 NOTE — TELEPHONE ENCOUNTER
----- Message from Delmy Hernandez sent at 10/3/2023  9:18 AM CDT -----  Type: Needs Medical Advice  Who Called:  pt     Best Call Back Number: [phone]    Additional Information: pt says he was unable to take medication and was told to contact office for a different option please advise

## 2023-10-03 NOTE — TELEPHONE ENCOUNTER
S/w pt. States he stopped taking the Jardiance and does not think it is helping. [facility] and his creatinine increased from 1.3 to 1.8 and his A1c increased from 8.2 to 8.7. Please advise.

## 2023-10-04 RX ORDER — GLIMEPIRIDE 2 MG/1
TABLET ORAL
Qty: 90 TABLET | Refills: 3 | Status: SHIPPED | OUTPATIENT
Start: 2023-10-04

## 2023-10-04 RX ORDER — PEN NEEDLE, DIABETIC 30 GX3/16"
NEEDLE, DISPOSABLE MISCELLANEOUS
Qty: 50 EACH | Refills: 6 | Status: SHIPPED | OUTPATIENT
Start: 2023-10-04

## 2023-10-04 RX ORDER — INSULIN GLARGINE 300 U/ML
12 INJECTION, SOLUTION SUBCUTANEOUS NIGHTLY
Qty: 4.5 ML | Refills: 6 | Status: SHIPPED | OUTPATIENT
Start: 2023-10-04 | End: 2024-01-02 | Stop reason: CLARIF

## 2023-10-04 RX ORDER — GLIMEPIRIDE 2 MG/1
TABLET ORAL
Qty: 270 TABLET | Refills: 3
Start: 2023-10-04 | End: 2023-10-04 | Stop reason: SDUPTHER

## 2023-10-04 NOTE — TELEPHONE ENCOUNTER
His lab work resulted this morning. Creatinine is 1.6, which is where it was in January if this year and August of last year. This is a better level than what the [facility] had but the level has increased from where it was in May. The A1c is really too soon to determine the effects on his blood work since it's a 90 day average and he's been on Jardiance only about ~30 days. However, if he isn't seeing any reduction in his blood sugars since starting the Jardiance, he can stop taking. Glimepiride isn't very effective by itself either. The remaining option, which would be most effective in reducing his blood sugars, is a once daily long-acting insulin. This is what I would recommend. Please let me know if he is open to this and we can schedule him with the educator for training.

## 2023-10-04 NOTE — TELEPHONE ENCOUNTER
Spoke with patient and notified him of Jumana's previous message and verbalized understanding    Jumana please send glimepiride again to pharmacy.It was sent as NO PRINT

## 2023-10-04 NOTE — TELEPHONE ENCOUNTER
Spoke with patient and notified him of Jumana's previous message and verbalized understanding     Pt agrees on you putting him on one a day insulin. He wants to know if it is necessary for him to do the training. He states both wife and daughters are nurses and if they can show him how to use it

## 2023-10-04 NOTE — TELEPHONE ENCOUNTER
I would be ok with his wife or daughter showing him how to use the pen.     Start Toujeo 12 units every evening. He may increase dose by 2 units once per week until morning blood sugars are less than 150. Then he'd stay at that dose going forward. However, don't exceed 24 units without notifying me.     He can stop the Jardiance if it didn't help.    I recommend reducing the glimepiride once he starts Toujeo. Take glimepiride 2 mg (1 tablet) with breakfast only.    Advise him to notify me if having any low blood sugars.

## 2023-11-20 ENCOUNTER — DOCUMENTATION ONLY (OUTPATIENT)
Dept: ADMINISTRATIVE | Facility: OTHER | Age: 78
End: 2023-11-20
Payer: MEDICARE

## 2023-11-22 NOTE — PROGRESS NOTES
2023      Karolyn Adams MD   [address] [facility] [address]      RE: Felix Sheridan     MR#:  [id number]    :  1945       DX:   1. 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  p16 positive,   but HPV 16 and 18 negative.  It is highly likely that this represents a metastasis from a squamous cell carcinoma of the immediately adjacent skin.  Head and neck cancer or other primaries seem much less likely.      2.       History of multiple skin cancers, including squamous cell carcinomas of the skin.        3.       Left partial nephrectomy in  for renal cell carcinoma.           Dear Karolyn:     Your patient returns five months after completing postoperative irradiation to the left inferior neck/supraclavicular area.  The patient has done well since I saw him on  of this year.  On , a CT scan of the neck and chest was performed with IV contrast, and these scans were negative.     The patient denies any new problems.  He is seeing Dr. Cote frequently for his tremendous tendency to develop skin cancers.     Physical exam shows a man in no distress.  There is no evidence of tumor recurrence in the left neck or supraclavicular area.  There is no palpable lymphadenopathy.     Assessment and Plan:  Symptomatically, he is doing well and there is no evidence for tumor recurrence on history, physical exam or CT scan.  According to the patient, they see Dr. Cote from dermatology  every three weeks.   I have asked to see the patient in four months, and he will return with a CT scan of the neck and chest without IV contrast.     I appreciate the opportunity to consult on your patients.  Please call me with any questions or comments.     Sincerely,          Electronically Signed By:  TRIPP Kay/Yuko  DD:  2023  DT:  2023  Job #:  [id number]    CC:  Jamie Perez M.D., [address] [place] [id number], Fax:  [phone]        Moisés Cowan M.D., [address] [facility] [place] [address], Fax: [phone]        Mulugeta Lemus M.D., [address] [facility] [place] [address], Fax: [phone]        Mook Cote M.D., [address] [place] [address], Fax: [phone]

## 2023-12-05 ENCOUNTER — LAB VISIT (OUTPATIENT)
Dept: PRIMARY CARE CLINIC | Facility: CLINIC | Age: 78
End: 2023-12-05
Payer: MEDICARE

## 2023-12-05 DIAGNOSIS — N18.32 TYPE 2 DIABETES MELLITUS WITH STAGE 3B CHRONIC KIDNEY DISEASE, WITHOUT LONG-TERM CURRENT USE OF INSULIN: ICD-10-CM

## 2023-12-05 DIAGNOSIS — E11.22 TYPE 2 DIABETES MELLITUS WITH STAGE 3B CHRONIC KIDNEY DISEASE, WITHOUT LONG-TERM CURRENT USE OF INSULIN: ICD-10-CM

## 2023-12-05 LAB
ALBUMIN SERPL BCP-MCNC: 4 G/DL (ref 3.5–5.2)
ALBUMIN/CREAT UR: 15.7 UG/MG (ref 0–30)
ALP SERPL-CCNC: 75 U/L (ref 55–135)
ALT SERPL W/O P-5'-P-CCNC: 10 U/L (ref 10–44)
ANION GAP SERPL CALC-SCNC: 13 MMOL/L (ref 8–16)
AST SERPL-CCNC: 29 U/L (ref 10–40)
BILIRUB SERPL-MCNC: 1 MG/DL (ref 0.1–1)
BUN SERPL-MCNC: 22 MG/DL (ref 8–23)
CALCIUM SERPL-MCNC: 9.2 MG/DL (ref 8.7–10.5)
CHLORIDE SERPL-SCNC: 100 MMOL/L (ref 95–110)
CO2 SERPL-SCNC: 23 MMOL/L (ref 23–29)
CREAT SERPL-MCNC: 1.5 MG/DL (ref 0.5–1.4)
CREAT UR-MCNC: 83 MG/DL (ref 23–375)
EST. GFR  (NO RACE VARIABLE): 46.8 ML/MIN/1.73 M^2
ESTIMATED AVG GLUCOSE: 160 MG/DL (ref 68–131)
GLUCOSE SERPL-MCNC: 122 MG/DL (ref 70–110)
HBA1C MFR BLD: 7.2 % (ref 4–5.6)
MICROALBUMIN UR DL<=1MG/L-MCNC: 13 UG/ML
POTASSIUM SERPL-SCNC: 4.8 MMOL/L (ref 3.5–5.1)
PROT SERPL-MCNC: 7.2 G/DL (ref 6–8.4)
SODIUM SERPL-SCNC: 136 MMOL/L (ref 136–145)

## 2023-12-05 PROCEDURE — 99499 NO LOS: ICD-10-PCS | Mod: S$GLB,,, | Performed by: NURSE PRACTITIONER

## 2023-12-05 PROCEDURE — 99499 UNLISTED E&M SERVICE: CPT | Mod: S$GLB,,, | Performed by: NURSE PRACTITIONER

## 2023-12-05 PROCEDURE — 83036 HEMOGLOBIN GLYCOSYLATED A1C: CPT | Performed by: NURSE PRACTITIONER

## 2023-12-05 PROCEDURE — 80053 COMPREHEN METABOLIC PANEL: CPT | Performed by: NURSE PRACTITIONER

## 2023-12-05 PROCEDURE — 82570 ASSAY OF URINE CREATININE: CPT | Performed by: NURSE PRACTITIONER

## 2024-01-02 ENCOUNTER — OFFICE VISIT (OUTPATIENT)
Dept: ENDOCRINOLOGY | Facility: CLINIC | Age: 79
End: 2024-01-02
Payer: MEDICARE

## 2024-01-02 VITALS
BODY MASS INDEX: 26.5 KG/M2 | SYSTOLIC BLOOD PRESSURE: 108 MMHG | HEART RATE: 52 BPM | OXYGEN SATURATION: 100 % | DIASTOLIC BLOOD PRESSURE: 60 MMHG | HEIGHT: 72 IN | WEIGHT: 195.69 LBS

## 2024-01-02 DIAGNOSIS — N18.31 TYPE 2 DIABETES MELLITUS WITH STAGE 3A CHRONIC KIDNEY DISEASE, WITHOUT LONG-TERM CURRENT USE OF INSULIN: Primary | ICD-10-CM

## 2024-01-02 DIAGNOSIS — E78.2 MIXED HYPERLIPIDEMIA: ICD-10-CM

## 2024-01-02 DIAGNOSIS — E11.22 TYPE 2 DIABETES MELLITUS WITH STAGE 3A CHRONIC KIDNEY DISEASE, WITHOUT LONG-TERM CURRENT USE OF INSULIN: Primary | ICD-10-CM

## 2024-01-02 DIAGNOSIS — I25.10 CORONARY ARTERY DISEASE INVOLVING NATIVE CORONARY ARTERY OF NATIVE HEART WITHOUT ANGINA PECTORIS: ICD-10-CM

## 2024-01-02 PROCEDURE — 3078F DIAST BP <80 MM HG: CPT | Mod: CPTII,S$GLB,, | Performed by: NURSE PRACTITIONER

## 2024-01-02 PROCEDURE — 99999 PR PBB SHADOW E&M-EST. PATIENT-LVL IV: CPT | Mod: PBBFAC,,, | Performed by: NURSE PRACTITIONER

## 2024-01-02 PROCEDURE — 1160F RVW MEDS BY RX/DR IN RCRD: CPT | Mod: CPTII,S$GLB,, | Performed by: NURSE PRACTITIONER

## 2024-01-02 PROCEDURE — 1101F PT FALLS ASSESS-DOCD LE1/YR: CPT | Mod: CPTII,S$GLB,, | Performed by: NURSE PRACTITIONER

## 2024-01-02 PROCEDURE — 99214 OFFICE O/P EST MOD 30 MIN: CPT | Mod: S$GLB,,, | Performed by: NURSE PRACTITIONER

## 2024-01-02 PROCEDURE — 3074F SYST BP LT 130 MM HG: CPT | Mod: CPTII,S$GLB,, | Performed by: NURSE PRACTITIONER

## 2024-01-02 PROCEDURE — 1159F MED LIST DOCD IN RCRD: CPT | Mod: CPTII,S$GLB,, | Performed by: NURSE PRACTITIONER

## 2024-01-02 PROCEDURE — 1126F AMNT PAIN NOTED NONE PRSNT: CPT | Mod: CPTII,S$GLB,, | Performed by: NURSE PRACTITIONER

## 2024-01-02 PROCEDURE — 3288F FALL RISK ASSESSMENT DOCD: CPT | Mod: CPTII,S$GLB,, | Performed by: NURSE PRACTITIONER

## 2024-01-02 RX ORDER — INSULIN GLARGINE 100 [IU]/ML
16 INJECTION, SOLUTION SUBCUTANEOUS NIGHTLY
COMMUNITY

## 2024-01-02 NOTE — PROGRESS NOTES
CC: Mr. Felix Sheridan arrives today for management of Type 2 DM and review of chronic medical conditions, as listed in the visit diagnosis section of this encounter.     HPI: Mr. Felix Sheridan was diagnosed with Type 2 DM in 1974. He was diagnosed based on lab work. Initial treatment consisted of metformin. This was later stopped, due to renal function. + FH of DM in mother, brothers. Denies hospitalizations due to DM.   History of renal cell carcinoma status post partial nephrectomy years ago. He follows with nephrology (Dr. Cowan).  Patient has CAD with multiple past PCI. His cardiologist is Dr. Dykes.     Patient was last seen by me in August. At that time, low dose Jardiance was resumed. He chose to stop taking [facility] ~ 4 weeks after starting and showed an increase in the creatinine and A1c (reported at 8.7%). He was then started on basal insulin and was advised to reduce the glimepiride dose.     He states that Toujeo was not covered so he is taking [facility].     He had recent R carotid endarterectomy. Will be having stress test on 1/17.     BG readings are checked 2x/day.               Hypoglycemia: Rare   Hypoglycemic Symptoms:  nausea  Hypoglycemia Treatment: Coke    Missing Insulin/PO medication doses: No    Exercise: recently resumed walking     Dietary Habits: Eats 3 meals/day. Rare snack.  Avoids sugary beverages.         CURRENT DIABETIC MEDS: glimepiride 2 mg daily, insulin glargine 16 units QHS  Glucometer type: Accucheck Guide  Uses pens    Previous DM treatments:  Metformin - discontinued due to renal function  Jardiance 10, 25 mg - discontinued due to renal function  Tradjenta - cost  Pioglitazone - BLE swelling  Glipizide  Toujeo - cost, formulary      Last Eye Exam: 2022, No DR, per pt  Last Podiatry Exam: n/a    REVIEW OF SYSTEMS  Constitutional: no c/o fatigue, weakness, weight loss. + 5# weight gain  Cardiac: no palpitations or  chest pain.  Respiratory: no cough or dyspnea.  GI: no c/o abdominal pain or nausea. Denies h/o pancreatitis.   Skin: no wounds, rashes. Reports R endarterectomy incision healing well.   Neuro: no numbness, tingling, or parasthesias.  Endocrine: denies polyphagia, polydipsia, polyuria      Personally reviewed Past Medical, Surgical, Social History.    Vital Signs  /60 (BP Location: Left arm, Patient Position: Sitting, BP Method: Medium (Manual))   Pulse (!) 52   Ht 6' (1.829 m)   Wt 88.7 kg (195 lb 10.5 oz)   SpO2 100%   BMI 26.54 kg/m²      Personally reviewed the below labs:    External labs dated 8/8/2023:              External labs dated 1/5/2023:          Hemoglobin A1C   Date Value Ref Range Status   12/05/2023 7.2 (H) 4.0 - 5.6 % Final     Comment:     ADA Screening Guidelines:  5.7-6.4%  Consistent with prediabetes  >or=6.5%  Consistent with diabetes    High levels of fetal hemoglobin interfere with the HbA1C  assay. Heterozygous hemoglobin variants (HbS, HgC, etc)do  not significantly interfere with this assay.   However, presence of multiple variants may affect accuracy.     05/30/2023 7.2 (H) 4.0 - 5.6 % Final     Comment:     ADA Screening Guidelines:  5.7-6.4%  Consistent with prediabetes  >or=6.5%  Consistent with diabetes    High levels of fetal hemoglobin interfere with the HbA1C  assay. Heterozygous hemoglobin variants (HbS, HgC, etc)do  not significantly interfere with this assay.   However, presence of multiple variants may affect accuracy.     01/04/2023 7.7 (H) 4.0 - 5.6 % Final     Comment:     ADA Screening Guidelines:  5.7-6.4%  Consistent with prediabetes  >or=6.5%  Consistent with diabetes    High levels of fetal hemoglobin interfere with the HbA1C  assay. Heterozygous hemoglobin variants (HbS, HgC, etc)do  not significantly interfere with this assay.   However, presence of multiple variants may affect accuracy.         Chemistry        Component Value Date/Time      "12/05/2023 0820    K 4.8 12/05/2023 0820     12/05/2023 0820    CO2 23 12/05/2023 0820    BUN 22 12/05/2023 0820    CREATININE 1.5 (H) 12/05/2023 0820     (H) 12/05/2023 0820        Component Value Date/Time    CALCIUM 9.2 12/05/2023 0820    ALKPHOS 75 12/05/2023 0820    AST 29 12/05/2023 0820    ALT 10 12/05/2023 0820    BILITOT 1.0 12/05/2023 0820    ESTGFRAFRICA 50.8 (A) 05/16/2022 1031    EGFRNONAA 44.0 (A) 05/16/2022 1031          Lab Results   Component Value Date    CHOL 134 01/04/2023    CHOL 126 09/10/2021     Lab Results   Component Value Date    HDL 44 01/04/2023    HDL 47 09/10/2021     Lab Results   Component Value Date    LDLCALC 65.0 01/04/2023    LDLCALC 63.8 09/10/2021     Lab Results   Component Value Date    TRIG 125 01/04/2023    TRIG 76 09/10/2021     Lab Results   Component Value Date    CHOLHDL 32.8 01/04/2023    CHOLHDL 37.3 09/10/2021       Lab Results   Component Value Date    MICALBCREAT 15.7 12/05/2023     No results found for: "TSH"    CrCl cannot be calculated (Patient's most recent lab result is older than the maximum 7 days allowed.).    No results found for: "[id number]"      PHYSICAL EXAMINATION  Constitutional: Appears well, no distress  Respiratory: CTA, even and unlabored.  Cardiovascular: RRR, no murmurs, no carotid bruits.  GI: active bowel sounds, no hernia noted.  Skin: warm and dry  Neuro: oriented to person, place, time  Feet: appropriate footwear.       Goals        HEMOGLOBIN A1C < 8               Assessment/Plan  1. Type 2 diabetes mellitus with stage 3b chronic kidney disease, without long-term current use of insulin -- Improving since starting insulin.   -- continue glargine 16 units QHS.   -- continue glimepiride 2 mg with breakfast  -- BG monitoring 2x/day, alternating times.   -- recommended Dexcom G7. Will send through DME. Advised him to contact me once he receives so we can schedule him for training.     Patient currently manages Type 2 diabetes " mellitus with an intensive insulin regimen consisting of 1 insulin injections/day. The patient has been using SMBG for frequent glucose monitoring. The patient requires a therapeutic CGM and is willing to use the CGM for the necessary frequent adjustments of insulin therapy. This is a medical necessity. I will continue to have in-person visits every 4 months to assess adherence to his CGM regimen and diabetes treatment plan.     -- recommended eye exam. He has outside provider he'd like to schedule with.     -- Reviewed hypoglycemia management: treat with 4 oz of juice, 4 oz regular soda, or 4 glucose tablets. Monitor and repeat treatment every 15 minutes until BG is >70 Then have a snack, which includes 15 grams of complex carbohydrates and protein.   Advised patient to check BG before activities, such as driving or exercise.  -- takes statin, ARB, aspirin   2. Coronary artery disease involving native coronary artery of native heart without angina pectoris  -- stable  -- follows with cardiology   3. Mixed hyperlipidemia  -- controlled  -- continue pravastatin         FOLLOW UP  Follow up in about 4 months (around 5/2/2024).   Patient instructed to bring BG logs to each follow up   Patient encouraged to call for any BG/medication issues, concerns, or questions.      Orders Placed This Encounter   Procedures    Hemoglobin A1C    Lipid Panel    Basic Metabolic Panel       Order Dexcom G7 through DME

## 2024-01-04 DIAGNOSIS — N18.31 TYPE 2 DIABETES MELLITUS WITH STAGE 3A CHRONIC KIDNEY DISEASE, WITHOUT LONG-TERM CURRENT USE OF INSULIN: Primary | ICD-10-CM

## 2024-01-04 DIAGNOSIS — E11.22 TYPE 2 DIABETES MELLITUS WITH STAGE 3A CHRONIC KIDNEY DISEASE, WITHOUT LONG-TERM CURRENT USE OF INSULIN: Primary | ICD-10-CM

## 2024-01-04 RX ORDER — BLOOD-GLUCOSE SENSOR
EACH MISCELLANEOUS
Qty: 9 EACH | Refills: 3 | Status: SHIPPED | OUTPATIENT
Start: 2024-01-04

## 2024-01-04 RX ORDER — BLOOD-GLUCOSE,RECEIVER,CONT
EACH MISCELLANEOUS
Qty: 1 EACH | Refills: 0 | Status: SHIPPED | OUTPATIENT
Start: 2024-01-04

## 2024-04-02 ENCOUNTER — LAB VISIT (OUTPATIENT)
Dept: PRIMARY CARE CLINIC | Facility: CLINIC | Age: 79
End: 2024-04-02
Payer: MEDICARE

## 2024-04-02 DIAGNOSIS — E11.22 TYPE 2 DIABETES MELLITUS WITH STAGE 3A CHRONIC KIDNEY DISEASE, WITHOUT LONG-TERM CURRENT USE OF INSULIN: ICD-10-CM

## 2024-04-02 DIAGNOSIS — N18.31 TYPE 2 DIABETES MELLITUS WITH STAGE 3A CHRONIC KIDNEY DISEASE, WITHOUT LONG-TERM CURRENT USE OF INSULIN: ICD-10-CM

## 2024-04-02 LAB
ANION GAP SERPL CALC-SCNC: 7 MMOL/L (ref 8–16)
BUN SERPL-MCNC: 28 MG/DL (ref 8–23)
CALCIUM SERPL-MCNC: 9.2 MG/DL (ref 8.7–10.5)
CHLORIDE SERPL-SCNC: 104 MMOL/L (ref 95–110)
CHOLEST SERPL-MCNC: 127 MG/DL (ref 120–199)
CHOLEST/HDLC SERPL: 3.3 {RATIO} (ref 2–5)
CO2 SERPL-SCNC: 24 MMOL/L (ref 23–29)
CREAT SERPL-MCNC: 1.7 MG/DL (ref 0.5–1.4)
EST. GFR  (NO RACE VARIABLE): 40.2 ML/MIN/1.73 M^2
ESTIMATED AVG GLUCOSE: 157 MG/DL (ref 68–131)
GLUCOSE SERPL-MCNC: 137 MG/DL (ref 70–110)
HBA1C MFR BLD: 7.1 % (ref 4–5.6)
HDLC SERPL-MCNC: 38 MG/DL (ref 40–75)
HDLC SERPL: 29.9 % (ref 20–50)
LDLC SERPL CALC-MCNC: 63.4 MG/DL (ref 63–159)
NONHDLC SERPL-MCNC: 89 MG/DL
POTASSIUM SERPL-SCNC: 4.9 MMOL/L (ref 3.5–5.1)
SODIUM SERPL-SCNC: 135 MMOL/L (ref 136–145)
TRIGL SERPL-MCNC: 128 MG/DL (ref 30–150)

## 2024-04-02 PROCEDURE — 80048 BASIC METABOLIC PNL TOTAL CA: CPT | Performed by: NURSE PRACTITIONER

## 2024-04-02 PROCEDURE — 36415 COLL VENOUS BLD VENIPUNCTURE: CPT | Mod: S$GLB,,, | Performed by: NURSE PRACTITIONER

## 2024-04-02 PROCEDURE — 80061 LIPID PANEL: CPT | Performed by: NURSE PRACTITIONER

## 2024-04-02 PROCEDURE — 83036 HEMOGLOBIN GLYCOSYLATED A1C: CPT | Performed by: NURSE PRACTITIONER

## 2024-05-08 ENCOUNTER — OFFICE VISIT (OUTPATIENT)
Dept: ENDOCRINOLOGY | Facility: CLINIC | Age: 79
End: 2024-05-08
Payer: MEDICARE

## 2024-05-08 VITALS
WEIGHT: 196.31 LBS | HEART RATE: 82 BPM | BODY MASS INDEX: 26.59 KG/M2 | DIASTOLIC BLOOD PRESSURE: 80 MMHG | SYSTOLIC BLOOD PRESSURE: 118 MMHG | HEIGHT: 72 IN

## 2024-05-08 DIAGNOSIS — I25.10 CORONARY ARTERY DISEASE INVOLVING NATIVE CORONARY ARTERY OF NATIVE HEART WITHOUT ANGINA PECTORIS: ICD-10-CM

## 2024-05-08 DIAGNOSIS — E78.2 MIXED HYPERLIPIDEMIA: ICD-10-CM

## 2024-05-08 DIAGNOSIS — E11.22 TYPE 2 DIABETES MELLITUS WITH STAGE 3A CHRONIC KIDNEY DISEASE, WITH LONG-TERM CURRENT USE OF INSULIN: Primary | ICD-10-CM

## 2024-05-08 DIAGNOSIS — E11.22 TYPE 2 DIABETES MELLITUS WITH STAGE 3 CHRONIC KIDNEY DISEASE, WITHOUT LONG-TERM CURRENT USE OF INSULIN, UNSPECIFIED WHETHER STAGE 3A OR 3B CKD: ICD-10-CM

## 2024-05-08 DIAGNOSIS — N18.30 TYPE 2 DIABETES MELLITUS WITH STAGE 3 CHRONIC KIDNEY DISEASE, WITHOUT LONG-TERM CURRENT USE OF INSULIN, UNSPECIFIED WHETHER STAGE 3A OR 3B CKD: ICD-10-CM

## 2024-05-08 DIAGNOSIS — N18.31 TYPE 2 DIABETES MELLITUS WITH STAGE 3A CHRONIC KIDNEY DISEASE, WITH LONG-TERM CURRENT USE OF INSULIN: Primary | ICD-10-CM

## 2024-05-08 DIAGNOSIS — Z79.4 TYPE 2 DIABETES MELLITUS WITH STAGE 3A CHRONIC KIDNEY DISEASE, WITH LONG-TERM CURRENT USE OF INSULIN: Primary | ICD-10-CM

## 2024-05-08 PROCEDURE — 3079F DIAST BP 80-89 MM HG: CPT | Mod: CPTII,S$GLB,, | Performed by: NURSE PRACTITIONER

## 2024-05-08 PROCEDURE — 1126F AMNT PAIN NOTED NONE PRSNT: CPT | Mod: CPTII,S$GLB,, | Performed by: NURSE PRACTITIONER

## 2024-05-08 PROCEDURE — 1101F PT FALLS ASSESS-DOCD LE1/YR: CPT | Mod: CPTII,S$GLB,, | Performed by: NURSE PRACTITIONER

## 2024-05-08 PROCEDURE — 95251 CONT GLUC MNTR ANALYSIS I&R: CPT | Mod: S$GLB,,, | Performed by: NURSE PRACTITIONER

## 2024-05-08 PROCEDURE — 1160F RVW MEDS BY RX/DR IN RCRD: CPT | Mod: CPTII,S$GLB,, | Performed by: NURSE PRACTITIONER

## 2024-05-08 PROCEDURE — 1159F MED LIST DOCD IN RCRD: CPT | Mod: CPTII,S$GLB,, | Performed by: NURSE PRACTITIONER

## 2024-05-08 PROCEDURE — 99999 PR PBB SHADOW E&M-EST. PATIENT-LVL IV: CPT | Mod: PBBFAC,,, | Performed by: NURSE PRACTITIONER

## 2024-05-08 PROCEDURE — 3074F SYST BP LT 130 MM HG: CPT | Mod: CPTII,S$GLB,, | Performed by: NURSE PRACTITIONER

## 2024-05-08 PROCEDURE — 3288F FALL RISK ASSESSMENT DOCD: CPT | Mod: CPTII,S$GLB,, | Performed by: NURSE PRACTITIONER

## 2024-05-08 PROCEDURE — 99214 OFFICE O/P EST MOD 30 MIN: CPT | Mod: S$GLB,,, | Performed by: NURSE PRACTITIONER

## 2024-05-08 RX ORDER — BLOOD-GLUCOSE,RECEIVER,CONT
EACH MISCELLANEOUS
Qty: 1 EACH | Refills: 0 | Status: SHIPPED | OUTPATIENT
Start: 2024-05-08

## 2024-05-08 RX ORDER — BLOOD-GLUCOSE,RECEIVER,CONT
EACH MISCELLANEOUS
Qty: 1 EACH | Refills: 0 | Status: SHIPPED | OUTPATIENT
Start: 2024-05-08 | End: 2024-05-08 | Stop reason: SDUPTHER

## 2024-05-08 RX ORDER — FLUOROURACIL 50 MG/G
CREAM TOPICAL
COMMUNITY
Start: 2024-03-15

## 2024-05-08 RX ORDER — BLOOD-GLUCOSE SENSOR
EACH MISCELLANEOUS
Qty: 9 EACH | Refills: 3 | Status: SHIPPED | OUTPATIENT
Start: 2024-05-08 | End: 2024-05-08 | Stop reason: SDUPTHER

## 2024-05-08 RX ORDER — BLOOD-GLUCOSE SENSOR
EACH MISCELLANEOUS
Qty: 9 EACH | Refills: 3 | Status: SHIPPED | OUTPATIENT
Start: 2024-05-08

## 2024-05-08 NOTE — PROGRESS NOTES
CC: Mr. Felix Sheridan arrives today for management of Type 2 DM and review of chronic medical conditions, as listed in the visit diagnosis section of this encounter.     HPI: Mr. Felix Sheridan was diagnosed with Type 2 DM in 1974. He was diagnosed based on lab work. Initial treatment consisted of metformin. Insulin added in 2023. This was later stopped, due to renal function. + FH of DM in mother, brothers. Denies hospitalizations due to DM.   History of renal cell carcinoma status post partial nephrectomy years ago. He follows with nephrology (Dr. Cowan).  Patient has CAD with multiple past PCI. His cardiologist is Dr. Dykes.     Patient was last seen by me in January.     BG monitoring per [facility]com G6 ([facility]). He states insurance didn't cover G7. Requests this be sent to [facility].    Hypoglycemia: Rare - does report recent low overnight. States that fingerstick is often higher.   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 Rare snack.  Avoids sugary beverages.         CURRENT DIABETIC MEDS: glimepiride 2 mg daily, insulin glargine 16 units QHS ([facility]ce[facility])  Glucometer type: Accucheck Guide  Uses pens    Previous DM treatments:  Metformin - discontinued due to renal function  Jardiance 10, 25 mg - discontinued due to renal function  Tradjenta - cost  Pioglitazone - BLE swelling  Glipizide  Toujeo - cost, formulary      Last Eye Exam: 2022, No DR, per pt. Brother is an optometrist so plans to see him  Last Podiatry Exam: n/a    REVIEW OF SYSTEMS  Constitutional: no c/o fatigue, weakness, weight loss.   Cardiac: no palpitations or chest pain.  Respiratory: no cough or dyspnea.  GI: no c/o abdominal pain or nausea. Denies h/o pancreatitis.   Skin: no wounds, rashes.   Neuro: no numbness, tingling, or parasthesias.  Endocrine: denies  polyphagia, polydipsia, polyuria      Personally reviewed Past Medical, Surgical, Social History.    Vital Signs  /80   Pulse 82   Ht 6' (1.829 m)   Wt 89 kg (196 lb 5.1 oz)   BMI 26.63 kg/m²      Personally reviewed the below labs:    External labs dated 8/8/2023:                  Hemoglobin A1C   Date Value Ref Range Status   04/02/2024 7.1 (H) 4.0 - 5.6 % Final     Comment:     ADA Screening Guidelines:  5.7-6.4%  Consistent with prediabetes  >or=6.5%  Consistent with diabetes    High levels of fetal hemoglobin interfere with the HbA1C  assay. Heterozygous hemoglobin variants (HbS, HgC, etc)do  not significantly interfere with this assay.   However, presence of multiple variants may affect accuracy.     12/05/2023 7.2 (H) 4.0 - 5.6 % Final     Comment:     ADA Screening Guidelines:  5.7-6.4%  Consistent with prediabetes  >or=6.5%  Consistent with diabetes    High levels of fetal hemoglobin interfere with the HbA1C  assay. Heterozygous hemoglobin variants (HbS, HgC, etc)do  not significantly interfere with this assay.   However, presence of multiple variants may affect accuracy.     05/30/2023 7.2 (H) 4.0 - 5.6 % Final     Comment:     ADA Screening Guidelines:  5.7-6.4%  Consistent with prediabetes  >or=6.5%  Consistent with diabetes    High levels of fetal hemoglobin interfere with the HbA1C  assay. Heterozygous hemoglobin variants (HbS, HgC, etc)do  not significantly interfere with this assay.   However, presence of multiple variants may affect accuracy.         Chemistry        Component Value Date/Time     (L) 04/02/2024 0821    K 4.9 04/02/2024 0821     04/02/2024 0821    CO2 24 04/02/2024 0821    BUN 28 (H) 04/02/2024 0821    CREATININE 1.7 (H) 04/02/2024 0821     (H) 04/02/2024 0821        Component Value Date/Time    CALCIUM 9.2 04/02/2024 0821    ALKPHOS 75 12/05/2023 0820    AST 29 12/05/2023 0820    ALT 10 12/05/2023 0820    BILITOT 1.0 12/05/2023 0820    ESTGFRAFRICA 50.8  "(A) 05/16/2022 1031    EGFRNONAA 44.0 (A) 05/16/2022 1031          Lab Results   Component Value Date    CHOL 127 04/02/2024    CHOL 134 01/04/2023    CHOL 126 09/10/2021     Lab Results   Component Value Date    HDL 38 (L) 04/02/2024    HDL 44 01/04/2023    HDL 47 09/10/2021     Lab Results   Component Value Date    LDLCALC 63.4 04/02/2024    LDLCALC 65.0 01/04/2023    LDLCALC 63.8 09/10/2021     Lab Results   Component Value Date    TRIG 128 04/02/2024    TRIG 125 01/04/2023    TRIG 76 09/10/2021     Lab Results   Component Value Date    CHOLHDL 29.9 04/02/2024    CHOLHDL 32.8 01/04/2023    CHOLHDL 37.3 09/10/2021       Lab Results   Component Value Date    MICALBCREAT 15.7 12/05/2023     No results found for: "TSH"    CrCl cannot be calculated (Patient's most recent lab result is older than the maximum 7 days allowed.).    No results found for: "[id number]"      PHYSICAL EXAMINATION  Constitutional: Appears well, no distress  Respiratory: CTA, even and unlabored.  Cardiovascular: RRR, no murmurs, no carotid bruits.  GI: active bowel sounds, no hernia noted.  Skin: warm and dry  Neuro: oriented to person, place, time  Feet: appropriate footwear.      DEXCOM G6 DOWNLOAD: most glucoses are well controlled. Infrequent hypoglycemia. A recent severe episode overnight that pt recalls was normal per fingerstick. Occasional prandial excursions.        Goals        HEMOGLOBIN A1C < 8               Assessment/Plan  1. Type 2 diabetes mellitus with stage 3b chronic kidney disease, without long-term current use of insulin -- Reasonable control. I suggested decreasing insulin dose but he'd like to continue on current dosage.   -- continue glargine 16 units QHS.   -- continue glimepiride 2 mg with breakfast  -- recommended Dexcom G7. Will send through pharmacy.   -- recommended eye exam. He has outside provider he'd like to schedule with.     -- Reviewed hypoglycemia management: treat with 4 oz of juice, 4 oz regular soda, or " 4 glucose tablets. Monitor and repeat treatment every 15 minutes until BG is >70 Then have a snack, which includes 15 grams of complex carbohydrates and protein.   Advised patient to check BG before activities, such as driving or exercise.  -- takes statin, ARB, aspirin   2. Coronary artery disease involving native coronary artery of native heart without angina pectoris  -- stable  -- follows with cardiology   3. Mixed hyperlipidemia  -- controlled  -- continue pravastatin         FOLLOW UP  Follow up in about 4 months (around 9/8/2024).   Patient instructed to bring BG logs to each follow up   Patient encouraged to call for any BG/medication issues, concerns, or questions.      Orders Placed This Encounter   Procedures    Hemoglobin A1C    Comprehensive Metabolic Panel    TSH

## 2024-09-05 ENCOUNTER — LAB VISIT (OUTPATIENT)
Dept: PRIMARY CARE CLINIC | Facility: CLINIC | Age: 79
End: 2024-09-05
Payer: MEDICARE

## 2024-09-05 DIAGNOSIS — Z79.4 TYPE 2 DIABETES MELLITUS WITH STAGE 3A CHRONIC KIDNEY DISEASE, WITH LONG-TERM CURRENT USE OF INSULIN: ICD-10-CM

## 2024-09-05 DIAGNOSIS — E11.22 TYPE 2 DIABETES MELLITUS WITH STAGE 3A CHRONIC KIDNEY DISEASE, WITH LONG-TERM CURRENT USE OF INSULIN: ICD-10-CM

## 2024-09-05 DIAGNOSIS — N18.31 TYPE 2 DIABETES MELLITUS WITH STAGE 3A CHRONIC KIDNEY DISEASE, WITH LONG-TERM CURRENT USE OF INSULIN: ICD-10-CM

## 2024-09-05 LAB
ALBUMIN SERPL BCP-MCNC: 4 G/DL (ref 3.5–5.2)
ALP SERPL-CCNC: 75 U/L (ref 55–135)
ALT SERPL W/O P-5'-P-CCNC: 11 U/L (ref 10–44)
ANION GAP SERPL CALC-SCNC: 7 MMOL/L (ref 8–16)
AST SERPL-CCNC: 22 U/L (ref 10–40)
BILIRUB SERPL-MCNC: 0.6 MG/DL (ref 0.1–1)
BUN SERPL-MCNC: 31 MG/DL (ref 8–23)
CALCIUM SERPL-MCNC: 10 MG/DL (ref 8.7–10.5)
CHLORIDE SERPL-SCNC: 100 MMOL/L (ref 95–110)
CO2 SERPL-SCNC: 24 MMOL/L (ref 23–29)
CREAT SERPL-MCNC: 1.6 MG/DL (ref 0.5–1.4)
EST. GFR  (NO RACE VARIABLE): 43.3 ML/MIN/1.73 M^2
ESTIMATED AVG GLUCOSE: 140 MG/DL (ref 68–131)
GLUCOSE SERPL-MCNC: 129 MG/DL (ref 70–110)
HBA1C MFR BLD: 6.5 % (ref 4–5.6)
POTASSIUM SERPL-SCNC: 5.5 MMOL/L (ref 3.5–5.1)
PROT SERPL-MCNC: 7 G/DL (ref 6–8.4)
SODIUM SERPL-SCNC: 131 MMOL/L (ref 136–145)
TSH SERPL DL<=0.005 MIU/L-ACNC: 2.94 UIU/ML (ref 0.4–4)

## 2024-09-05 PROCEDURE — 83036 HEMOGLOBIN GLYCOSYLATED A1C: CPT | Performed by: NURSE PRACTITIONER

## 2024-09-05 PROCEDURE — 84443 ASSAY THYROID STIM HORMONE: CPT | Performed by: NURSE PRACTITIONER

## 2024-09-05 PROCEDURE — 80053 COMPREHEN METABOLIC PANEL: CPT | Performed by: NURSE PRACTITIONER

## 2024-09-05 NOTE — PROGRESS NOTES
Confirmed name and , venipuncture performed with 21 gauge butterfly, x's 1 attempt , R antecubital. Labs drawn per orders, pressure held X 1 min, Coban dressing applied. Advised pt in aftercare instructions.

## 2024-09-12 ENCOUNTER — OFFICE VISIT (OUTPATIENT)
Dept: ENDOCRINOLOGY | Facility: CLINIC | Age: 79
End: 2024-09-12
Payer: MEDICARE

## 2024-09-12 VITALS
WEIGHT: 197.56 LBS | HEIGHT: 72 IN | HEART RATE: 71 BPM | BODY MASS INDEX: 26.76 KG/M2 | SYSTOLIC BLOOD PRESSURE: 100 MMHG | DIASTOLIC BLOOD PRESSURE: 60 MMHG

## 2024-09-12 DIAGNOSIS — N18.31 TYPE 2 DIABETES MELLITUS WITH STAGE 3A CHRONIC KIDNEY DISEASE, WITH LONG-TERM CURRENT USE OF INSULIN: Primary | ICD-10-CM

## 2024-09-12 DIAGNOSIS — E11.22 TYPE 2 DIABETES MELLITUS WITH STAGE 3B CHRONIC KIDNEY DISEASE, WITHOUT LONG-TERM CURRENT USE OF INSULIN: ICD-10-CM

## 2024-09-12 DIAGNOSIS — E78.2 MIXED HYPERLIPIDEMIA: ICD-10-CM

## 2024-09-12 DIAGNOSIS — Z79.4 TYPE 2 DIABETES MELLITUS WITH STAGE 3A CHRONIC KIDNEY DISEASE, WITH LONG-TERM CURRENT USE OF INSULIN: Primary | ICD-10-CM

## 2024-09-12 DIAGNOSIS — E11.22 TYPE 2 DIABETES MELLITUS WITH STAGE 3A CHRONIC KIDNEY DISEASE, WITH LONG-TERM CURRENT USE OF INSULIN: Primary | ICD-10-CM

## 2024-09-12 DIAGNOSIS — I25.10 CORONARY ARTERY DISEASE INVOLVING NATIVE CORONARY ARTERY OF NATIVE HEART WITHOUT ANGINA PECTORIS: ICD-10-CM

## 2024-09-12 DIAGNOSIS — N18.32 TYPE 2 DIABETES MELLITUS WITH STAGE 3B CHRONIC KIDNEY DISEASE, WITHOUT LONG-TERM CURRENT USE OF INSULIN: ICD-10-CM

## 2024-09-12 PROCEDURE — 99999 PR PBB SHADOW E&M-EST. PATIENT-LVL IV: CPT | Mod: PBBFAC,,, | Performed by: NURSE PRACTITIONER

## 2024-09-12 RX ORDER — GLIMEPIRIDE 2 MG/1
TABLET ORAL
Qty: 90 TABLET | Refills: 3 | Status: SHIPPED | OUTPATIENT
Start: 2024-09-12

## 2024-09-12 NOTE — PROGRESS NOTES
CC: Mr. Felix Sheridan arrives today for management of Type 2 DM and review of chronic medical conditions, as listed in the visit diagnosis section of this encounter.     HPI: Mr. Felix Sheridan was diagnosed with Type 2 DM in 1974. He was diagnosed based on lab work. Initial treatment consisted of metformin. Insulin added in 2023. This was later stopped, due to renal function. + FH of DM in mother, brothers. Denies hospitalizations due to DM.   History of renal cell carcinoma status post partial nephrectomy years ago. He follows with nephrology (Dr. Cowan).  Patient has CAD with multiple past PCI. His cardiologist is Dr. Dykes.     Patient was last seen by me in May.    BG monitoring per Dexcom G7. States this is more expensive than the G6 was through [facility].     Hypoglycemia: Rare   Hypoglycemic Symptoms:  nausea  Hypoglycemia Treatment: Coke    Missing Insulin/PO medication doses: No    Exercise: Walks 2 miles 4-5 days/week    Dietary Habits: Eats 3 meals/day. May snack on berries or 1/2 apple.  Avoids sugary beverages.     He denies taking any NSAIDS, potassium supplements.       CURRENT DIABETIC MEDS: glimepiride 2 mg daily, insulin glargine 16 units QHS ([facility])  Glucometer type: Accucheck Guide  Uses pens    Previous DM treatments:  Metformin - discontinued due to renal function  Jardiance 10, 25 mg - discontinued due to renal function  Tradjenta - cost  Pioglitazone - BLE swelling  Glipizide  Toujeo - cost, formulary      Last Eye Exam: 2022, No DR, per pt. Brother is an optometrist so plans to see him  Last Podiatry Exam: n/a    REVIEW OF SYSTEMS  Constitutional: no c/o fatigue, weakness, weight loss.   Cardiac: no palpitations or chest pain.  Respiratory: no cough or dyspnea.  GI: no c/o abdominal pain or nausea. Denies h/o pancreatitis.   Skin: no wounds, rashes. Reports pre-cancerous lesions to arms. Past SCC to ear. Sees derm every 6 months.   Neuro: no numbness, tingling, or  parasthesias.  Endocrine: denies polyphagia, polydipsia, polyuria      Personally reviewed Past Medical, Surgical, Social History.    Vital Signs  /60   Pulse 71   Ht 6' (1.829 m)   Wt 89.6 kg (197 lb 8.5 oz)   BMI 26.79 kg/m²      Personally reviewed the below labs:    External labs dated 8/8/2023:                  Hemoglobin A1C   Date Value Ref Range Status   09/05/2024 6.5 (H) 4.0 - 5.6 % Final     Comment:     ADA Screening Guidelines:  5.7-6.4%  Consistent with prediabetes  >or=6.5%  Consistent with diabetes    High levels of fetal hemoglobin interfere with the HbA1C  assay. Heterozygous hemoglobin variants (HbS, HgC, etc)do  not significantly interfere with this assay.   However, presence of multiple variants may affect accuracy.     04/02/2024 7.1 (H) 4.0 - 5.6 % Final     Comment:     ADA Screening Guidelines:  5.7-6.4%  Consistent with prediabetes  >or=6.5%  Consistent with diabetes    High levels of fetal hemoglobin interfere with the HbA1C  assay. Heterozygous hemoglobin variants (HbS, HgC, etc)do  not significantly interfere with this assay.   However, presence of multiple variants may affect accuracy.     12/05/2023 7.2 (H) 4.0 - 5.6 % Final     Comment:     ADA Screening Guidelines:  5.7-6.4%  Consistent with prediabetes  >or=6.5%  Consistent with diabetes    High levels of fetal hemoglobin interfere with the HbA1C  assay. Heterozygous hemoglobin variants (HbS, HgC, etc)do  not significantly interfere with this assay.   However, presence of multiple variants may affect accuracy.         Chemistry        Component Value Date/Time     (L) 09/05/2024 0840    K 5.5 (H) 09/05/2024 0840     09/05/2024 0840    CO2 24 09/05/2024 0840    BUN 31 (H) 09/05/2024 0840    CREATININE 1.6 (H) 09/05/2024 0840     (H) 09/05/2024 0840        Component Value Date/Time    CALCIUM 10.0 09/05/2024 0840    ALKPHOS 75 09/05/2024 0840    AST 22 09/05/2024 0840    ALT 11 09/05/2024 0840    BILITOT  "0.6 09/05/2024 0840    ESTGFRAFRICA 50.8 (A) 05/16/2022 1031    EGFRNONAA 44.0 (A) 05/16/2022 1031          Lab Results   Component Value Date    CHOL 127 04/02/2024    CHOL 134 01/04/2023    CHOL 126 09/10/2021     Lab Results   Component Value Date    HDL 38 (L) 04/02/2024    HDL 44 01/04/2023    HDL 47 09/10/2021     Lab Results   Component Value Date    LDLCALC 63.4 04/02/2024    LDLCALC 65.0 01/04/2023    LDLCALC 63.8 09/10/2021     Lab Results   Component Value Date    TRIG 128 04/02/2024    TRIG 125 01/04/2023    TRIG 76 09/10/2021     Lab Results   Component Value Date    CHOLHDL 29.9 04/02/2024    CHOLHDL 32.8 01/04/2023    CHOLHDL 37.3 09/10/2021       Lab Results   Component Value Date    MICALBCREAT 15.7 12/05/2023     Lab Results   Component Value Date    TSH 2.937 09/05/2024       CrCl cannot be calculated (Patient's most recent lab result is older than the maximum 7 days allowed.).    No results found for: "[id number]"      PHYSICAL EXAMINATION  Constitutional: Appears well, no distress  Respiratory: CTA, even and unlabored.  Cardiovascular: RRR, no murmurs, no carotid bruits.  GI: active bowel sounds, no hernia noted.  Skin: warm and dry  Neuro: oriented to person, place, time  Feet: appropriate footwear.      DEXCOM G6 DOWNLOAD: Glucoses are well controlled overall. Infrequent prandial excursions. Rare hypoglycemia.          Goals        HEMOGLOBIN A1C < 8               Assessment/Plan  1. Type 2 diabetes mellitus with stage 3b chronic kidney disease, without long-term current use of insulin -- Controlled and tolerating regimen well.   -- continue glargine 16 units QHS.   -- continue glimepiride 2 mg with breakfast  -- he will look into Dexcom G7 cost with local pharmacy.   -- recommended eye exam. He has outside provider he'd like to schedule with.     -- Reviewed hypoglycemia management: treat with 4 oz of juice, 4 oz regular soda, or 4 glucose tablets. Monitor and repeat treatment every 15 " minutes until BG is >70 Then have a snack, which includes 15 grams of complex carbohydrates and protein.   Advised patient to check BG before activities, such as driving or exercise.  -- takes statin, ARB, aspirin   2. Coronary artery disease involving native coronary artery of native heart without angina pectoris  -- stable  -- follows with cardiology   3. Mixed hyperlipidemia  -- controlled  -- continue pravastatin         FOLLOW UP  Follow up in about 4 months (around 1/12/2025).   Patient instructed to bring BG logs to each follow up   Patient encouraged to call for any BG/medication issues, concerns, or questions.      Orders Placed This Encounter   Procedures    Hemoglobin A1C    Microalbumin/Creatinine Ratio, Urine    Comprehensive Metabolic Panel

## 2024-12-13 ENCOUNTER — DOCUMENTATION ONLY (OUTPATIENT)
Dept: ADMINISTRATIVE | Facility: OTHER | Age: 79
End: 2024-12-13
Payer: MEDICARE

## 2024-12-13 NOTE — PROGRESS NOTES
2024      Karolyn Adams MD   [address] [facility] [place] [address]      RE: Felix Sheridan     MR#:  [id number]    :  1945       DX:   1. 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  p16 positive,   but HPV 16 and 18 negative.  It is highly likely that this represents a metastasis from a squamous cell carcinoma of the immediately adjacent skin.  Head and neck cancer or other primaries seem much less likely.        2.       History of multiple skin cancers, including squamous cell carcinomas of the skin.          3.      Left partial nephrectomy in  for renal cell carcinoma.             Dear Karolyn:     Your patient returns 1.5 years after completing postoperative irradiation to the left inferior neck/supraclavicular area.  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  The patient has been seeing Dr. Mark Rivera routinely for Dermatologic care.     On , yesterday, a CT scan of the neck and chest without IV contrast was negative.     Symptomatically, the patient is doing well and he denies problems.  He is protecting himself from the sun.     Physical exam shows a man in no distress.  There is no evidence of tumor recurrence in the left neck.  All tissues are healthy.  Right neck examination is also unremarkable.  The patient has very fair skin and significant sun damage.     Assessment and Plan:  Symptomatically, he is doing well and there is no evidence for tumor recurrence on history, physical exam or CT scans.  The patient sees Dr. Mark Rivera regularly.  I have asked to see the patient back in six months, and he will return with CT of the neck and chest without IV contrast.       20 minutes was spent in this follow up, of which five minutes was spent in record review, 10 minutes was spent directly with the patient and  his wife, and five minutes was spent in documentation.     I appreciate the opportunity to consult on your patients.  Please call me with any questions or comments.     Sincerely,          Electronically Signed By:  Yandel Porter M.D.    GH/MedQ  DD:  12/13/2024  DT:  12/13/2024  Job #:  [id number]    CC:  Moisés Cowan M.D., [address] [facility] [place] [address], Fax: [phone]        Jamie Perez M.D., [address] [place] [address], Fax: [phone]        Mulugeta Lemus M.D., [address] [facility] [place] [address], Fax: [phone]        MORELIA Fernández, [address] [place] [address], Fax: [phone]        Karolyn Adams M.D., [address] [facility] [address], [place] [address], Fax: [phone]

## 2025-01-16 ENCOUNTER — LAB VISIT (OUTPATIENT)
Dept: PRIMARY CARE CLINIC | Facility: CLINIC | Age: 80
End: 2025-01-16
Payer: MEDICARE

## 2025-01-16 DIAGNOSIS — E11.22 TYPE 2 DIABETES MELLITUS WITH STAGE 3A CHRONIC KIDNEY DISEASE, WITH LONG-TERM CURRENT USE OF INSULIN: ICD-10-CM

## 2025-01-16 DIAGNOSIS — Z79.4 TYPE 2 DIABETES MELLITUS WITH STAGE 3A CHRONIC KIDNEY DISEASE, WITH LONG-TERM CURRENT USE OF INSULIN: ICD-10-CM

## 2025-01-16 DIAGNOSIS — N18.31 TYPE 2 DIABETES MELLITUS WITH STAGE 3A CHRONIC KIDNEY DISEASE, WITH LONG-TERM CURRENT USE OF INSULIN: ICD-10-CM

## 2025-01-16 LAB
ALBUMIN SERPL BCP-MCNC: 3.9 G/DL (ref 3.5–5.2)
ALBUMIN/CREAT UR: 26.3 UG/MG (ref 0–30)
ALP SERPL-CCNC: 83 U/L (ref 40–150)
ALT SERPL W/O P-5'-P-CCNC: 13 U/L (ref 10–44)
ANION GAP SERPL CALC-SCNC: 7 MMOL/L (ref 8–16)
AST SERPL-CCNC: 21 U/L (ref 10–40)
BILIRUB SERPL-MCNC: 0.6 MG/DL (ref 0.1–1)
BUN SERPL-MCNC: 28 MG/DL (ref 8–23)
CALCIUM SERPL-MCNC: 9.1 MG/DL (ref 8.7–10.5)
CHLORIDE SERPL-SCNC: 101 MMOL/L (ref 95–110)
CO2 SERPL-SCNC: 24 MMOL/L (ref 23–29)
CREAT SERPL-MCNC: 1.5 MG/DL (ref 0.5–1.4)
CREAT UR-MCNC: 57 MG/DL (ref 23–375)
EST. GFR  (NO RACE VARIABLE): 46.5 ML/MIN/1.73 M^2
ESTIMATED AVG GLUCOSE: 151 MG/DL (ref 68–131)
GLUCOSE SERPL-MCNC: 126 MG/DL (ref 70–110)
HBA1C MFR BLD: 6.9 % (ref 4–5.6)
MICROALBUMIN UR DL<=1MG/L-MCNC: 15 UG/ML
POTASSIUM SERPL-SCNC: 4.8 MMOL/L (ref 3.5–5.1)
PROT SERPL-MCNC: 7.4 G/DL (ref 6–8.4)
SODIUM SERPL-SCNC: 132 MMOL/L (ref 136–145)

## 2025-01-16 PROCEDURE — 82043 UR ALBUMIN QUANTITATIVE: CPT | Performed by: NURSE PRACTITIONER

## 2025-01-16 PROCEDURE — 80053 COMPREHEN METABOLIC PANEL: CPT | Performed by: NURSE PRACTITIONER

## 2025-01-16 PROCEDURE — 83036 HEMOGLOBIN GLYCOSYLATED A1C: CPT | Performed by: NURSE PRACTITIONER

## 2025-01-16 NOTE — PROGRESS NOTES
Venipuncture performed with 23 gauge butterfly, x's 1 attempt.  Successful venipuncture to R Basilic vein.  Specimens collected per orders.       Pressure dressing applied to site, instructed patient to remove dressing in 10-15 minutes, OK to re-adjust dressing if pressure causing any discomfort, to observe closely for numbness and/or discoloration to hand or fingers, and to notify provider if bleeding persists after applying constant pressure lasting 30 minutes.

## 2025-01-24 ENCOUNTER — TELEPHONE (OUTPATIENT)
Dept: ENDOCRINOLOGY | Facility: CLINIC | Age: 80
End: 2025-01-24
Payer: MEDICARE

## 2025-01-24 NOTE — TELEPHONE ENCOUNTER
----- Message from Ford sent at 1/23/2025  8:41 AM CST -----  Regarding: reschedule  Contact: patient  Type:  Patient Returning Call    Who Called:patient  Who Left Message for Patient:staff  Does the patient know what this is regarding?:reschedule due to weather  Would the patient rather a call back or a response via [facility]? Please call  Best Call Back Number:[phone]  Additional Information:

## 2025-01-28 ENCOUNTER — OFFICE VISIT (OUTPATIENT)
Dept: ENDOCRINOLOGY | Facility: CLINIC | Age: 80
End: 2025-01-28
Payer: MEDICARE

## 2025-01-28 ENCOUNTER — TELEPHONE (OUTPATIENT)
Dept: ENDOCRINOLOGY | Facility: CLINIC | Age: 80
End: 2025-01-28

## 2025-01-28 VITALS
HEIGHT: 72 IN | WEIGHT: 196 LBS | SYSTOLIC BLOOD PRESSURE: 100 MMHG | BODY MASS INDEX: 26.55 KG/M2 | HEART RATE: 85 BPM | DIASTOLIC BLOOD PRESSURE: 68 MMHG

## 2025-01-28 DIAGNOSIS — E11.22 TYPE 2 DIABETES MELLITUS WITH STAGE 3A CHRONIC KIDNEY DISEASE, WITH LONG-TERM CURRENT USE OF INSULIN: Primary | ICD-10-CM

## 2025-01-28 DIAGNOSIS — E78.2 MIXED HYPERLIPIDEMIA: ICD-10-CM

## 2025-01-28 DIAGNOSIS — I25.10 CORONARY ARTERY DISEASE INVOLVING NATIVE CORONARY ARTERY OF NATIVE HEART WITHOUT ANGINA PECTORIS: ICD-10-CM

## 2025-01-28 DIAGNOSIS — N18.31 TYPE 2 DIABETES MELLITUS WITH STAGE 3A CHRONIC KIDNEY DISEASE, WITH LONG-TERM CURRENT USE OF INSULIN: Primary | ICD-10-CM

## 2025-01-28 DIAGNOSIS — Z79.4 TYPE 2 DIABETES MELLITUS WITH STAGE 3A CHRONIC KIDNEY DISEASE, WITH LONG-TERM CURRENT USE OF INSULIN: Primary | ICD-10-CM

## 2025-01-28 PROCEDURE — G2211 COMPLEX E/M VISIT ADD ON: HCPCS | Mod: S$GLB,,, | Performed by: NURSE PRACTITIONER

## 2025-01-28 PROCEDURE — 3078F DIAST BP <80 MM HG: CPT | Mod: CPTII,S$GLB,, | Performed by: NURSE PRACTITIONER

## 2025-01-28 PROCEDURE — 1160F RVW MEDS BY RX/DR IN RCRD: CPT | Mod: CPTII,S$GLB,, | Performed by: NURSE PRACTITIONER

## 2025-01-28 PROCEDURE — 1101F PT FALLS ASSESS-DOCD LE1/YR: CPT | Mod: CPTII,S$GLB,, | Performed by: NURSE PRACTITIONER

## 2025-01-28 PROCEDURE — 3288F FALL RISK ASSESSMENT DOCD: CPT | Mod: CPTII,S$GLB,, | Performed by: NURSE PRACTITIONER

## 2025-01-28 PROCEDURE — 99214 OFFICE O/P EST MOD 30 MIN: CPT | Mod: S$GLB,,, | Performed by: NURSE PRACTITIONER

## 2025-01-28 PROCEDURE — 1126F AMNT PAIN NOTED NONE PRSNT: CPT | Mod: CPTII,S$GLB,, | Performed by: NURSE PRACTITIONER

## 2025-01-28 PROCEDURE — 95251 CONT GLUC MNTR ANALYSIS I&R: CPT | Mod: S$GLB,,, | Performed by: NURSE PRACTITIONER

## 2025-01-28 PROCEDURE — 3074F SYST BP LT 130 MM HG: CPT | Mod: CPTII,S$GLB,, | Performed by: NURSE PRACTITIONER

## 2025-01-28 PROCEDURE — 1159F MED LIST DOCD IN RCRD: CPT | Mod: CPTII,S$GLB,, | Performed by: NURSE PRACTITIONER

## 2025-01-28 PROCEDURE — 99999 PR PBB SHADOW E&M-EST. PATIENT-LVL IV: CPT | Mod: PBBFAC,,, | Performed by: NURSE PRACTITIONER

## 2025-01-28 RX ORDER — BLOOD-GLUCOSE SENSOR
EACH MISCELLANEOUS
Qty: 9 EACH | Refills: 3 | Status: SHIPPED | OUTPATIENT
Start: 2025-01-28 | End: 2025-01-31 | Stop reason: SDUPTHER

## 2025-01-28 NOTE — PATIENT INSTRUCTIONS
Decrease insulin to 14 units. If your fasting blood sugars in the morning are consistently greater than 150, increase insulin back to 16 units.

## 2025-01-28 NOTE — TELEPHONE ENCOUNTER
S/w pt notified him the [facility] in [place] is permanently closed. Advised him to call the [facility] in [place] and see if they will refill his Rx for Dexcom and to let us Know.Pt v/a

## 2025-01-28 NOTE — PROGRESS NOTES
CC: Mr. Felix Sheridan arrives today for management of Type 2 DM and review of chronic medical conditions, as listed in the visit diagnosis section of this encounter.     HPI: Mr. Felix Sheridan was diagnosed with Type 2 DM in 1974. He was diagnosed based on lab work. Initial treatment consisted of metformin. Insulin added in 2023. This was later stopped, due to renal function. + FH of DM in mother, brothers. Denies hospitalizations due to DM.   History of renal cell carcinoma status post partial nephrectomy years ago. He follows with nephrology (Dr. Cowan).  Patient has CAD with multiple past PCI. His cardiologist is Dr. Dykes.     Patient was last seen by me in September.    He has noticed higher blood sugars after his 2 cups of black coffee in the morning. He states this is brief and it then returns to normal.      BG monitoring per Dexcom G7.     Hypoglycemia: Rare   Hypoglycemic Symptoms:  nausea  Hypoglycemia Treatment: Coke    Missing Insulin/PO medication doses: No    Exercise: Walks 2 miles 4-5 days/week    Dietary Habits: Eats 3 meals/day. Breakfast is usually small.  Avoids sugary beverages.         CURRENT DIABETIC MEDS: glimepiride 2 mg daily, insulin glargine 16 units QHS [facility])  Glucometer type: Accucheck Guide  Uses pens    Previous DM treatments:  Metformin - discontinued due to renal function  Jardiance 10, 25 mg - discontinued due to renal function  Tradjenta - cost  Pioglitazone - BLE swelling  Glipizide  Toujeo - cost, formulary      Last Eye Exam: 2022, No DR, per pt. Brother is an optometrist so plans to see him  Last Podiatry Exam: n/a    REVIEW OF SYSTEMS  Constitutional: no c/o fatigue, weakness, weight loss.   Cardiac: no palpitations or chest pain.  Respiratory: no cough or dyspnea.  GI: no c/o abdominal pain or nausea. Denies h/o pancreatitis.   Skin: no wounds, rashes. Past SCC, BCC. Sees derm regularly..   Neuro: no numbness, tingling, or parasthesias.  Endocrine:  denies polyphagia, polydipsia, polyuria      Personally reviewed Past Medical, Surgical, Social History.    Vital Signs  /68   Pulse 85   Ht 6' (1.829 m)   Wt 88.9 kg (195 lb 15.8 oz)   BMI 26.58 kg/m²      Personally reviewed the below labs:    Hemoglobin A1C   Date Value Ref Range Status   01/16/2025 6.9 (H) 4.0 - 5.6 % Final     Comment:     ADA Screening Guidelines:  5.7-6.4%  Consistent with prediabetes  >or=6.5%  Consistent with diabetes    High levels of fetal hemoglobin interfere with the HbA1C  assay. Heterozygous hemoglobin variants (HbS, HgC, etc)do  not significantly interfere with this assay.   However, presence of multiple variants may affect accuracy.     09/05/2024 6.5 (H) 4.0 - 5.6 % Final     Comment:     ADA Screening Guidelines:  5.7-6.4%  Consistent with prediabetes  >or=6.5%  Consistent with diabetes    High levels of fetal hemoglobin interfere with the HbA1C  assay. Heterozygous hemoglobin variants (HbS, HgC, etc)do  not significantly interfere with this assay.   However, presence of multiple variants may affect accuracy.     04/02/2024 7.1 (H) 4.0 - 5.6 % Final     Comment:     ADA Screening Guidelines:  5.7-6.4%  Consistent with prediabetes  >or=6.5%  Consistent with diabetes    High levels of fetal hemoglobin interfere with the HbA1C  assay. Heterozygous hemoglobin variants (HbS, HgC, etc)do  not significantly interfere with this assay.   However, presence of multiple variants may affect accuracy.         Chemistry        Component Value Date/Time     (L) 01/16/2025 0839    K 4.8 01/16/2025 0839     01/16/2025 0839    CO2 24 01/16/2025 0839    BUN 28 (H) 01/16/2025 0839    CREATININE 1.5 (H) 01/16/2025 0839     (H) 01/16/2025 0839        Component Value Date/Time    CALCIUM 9.1 01/16/2025 0839    ALKPHOS 83 01/16/2025 0839    AST 21 01/16/2025 0839    ALT 13 01/16/2025 0839    BILITOT 0.6 01/16/2025 0839    ESTGFRAFRICA 50.8 (A) 05/16/2022 1031    EGFRNONAA 44.0  "(A) 05/16/2022 1031          Lab Results   Component Value Date    CHOL 127 04/02/2024    CHOL 134 01/04/2023    CHOL 126 09/10/2021     Lab Results   Component Value Date    HDL 38 (L) 04/02/2024    HDL 44 01/04/2023    HDL 47 09/10/2021     Lab Results   Component Value Date    LDLCALC 63.4 04/02/2024    LDLCALC 65.0 01/04/2023    LDLCALC 63.8 09/10/2021     Lab Results   Component Value Date    TRIG 128 04/02/2024    TRIG 125 01/04/2023    TRIG 76 09/10/2021     Lab Results   Component Value Date    CHOLHDL 29.9 04/02/2024    CHOLHDL 32.8 01/04/2023    CHOLHDL 37.3 09/10/2021       Lab Results   Component Value Date    MICALBCREAT 26.3 01/16/2025     Lab Results   Component Value Date    TSH 2.937 09/05/2024       CrCl cannot be calculated (Patient's most recent lab result is older than the maximum 7 days allowed.).    No results found for: "[id number]"      PHYSICAL EXAMINATION  Constitutional: Appears well, no distress  Respiratory: CTA, even and unlabored.  Cardiovascular: RRR, no murmurs, no carotid bruits.  Skin: warm and dry  Neuro: oriented to person, place, time  Feet: appropriate footwear.      DEXCOM G6 DOWNLOAD: Glucoses are well controlled. Infrequent prandial excursions. Rare hypoglycemia - episode noted after placing new sensor. Some borderline hypoglycemia noted overnight/early morning.         Goals        HEMOGLOBIN A1C < 8               Assessment/Plan  1. 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 Some fasting glucoses are tightly controlled.  -- decrease glargine to 14 units QHS.   -- continue glimepiride 2 mg with breakfast  -- continue Dexcom G7. Will send to [facility] in [place].   -- recommended eye exam. He has outside provider he'd like to schedule with.     -- Reviewed hypoglycemia management: treat with 4 oz of juice, 4 oz regular soda, or 4 glucose tablets. Monitor and repeat treatment every 15 minutes " until BG is >70 Then have a snack, which includes 15 grams of complex carbohydrates and protein.   Advised patient to check BG before activities, such as driving or exercise.  -- takes statin, ARB, aspirin   2. Coronary artery disease involving native coronary artery of native heart without angina pectoris  -- stable  -- follows with cardiology   3. Mixed hyperlipidemia  -- controlled  -- continue pravastatin  -- lipid panel with RTC         FOLLOW UP  Follow up in about 4 months (around 5/28/2025).   Patient instructed to bring BG logs to each follow up   Patient encouraged to call for any BG/medication issues, concerns, or questions.      Orders Placed This Encounter   Procedures    TSH    Hemoglobin A1C    Lipid Panel    Comprehensive Metabolic Panel

## 2025-01-29 ENCOUNTER — TELEPHONE (OUTPATIENT)
Dept: ENDOCRINOLOGY | Facility: CLINIC | Age: 80
End: 2025-01-29
Payer: MEDICARE

## 2025-01-29 NOTE — TELEPHONE ENCOUNTER
----- Message from Walker sent at 1/28/2025  4:27 PM CST -----  Regarding: advice  Contact: ANANTH PEREZ [[id number]]  Type: Needs Medical Advice  Who Called:  Ananth    Symptoms (please be specific):  na    How long has patient had these symptoms:  na    Pharmacy name and phone #:    [facility] [address] [address]  Phone: [phone] Fax: [phone]      Best Call Back Number: [phone] (home)     Additional Information: Send Rx to above pharm. Please call to advise.

## 2025-01-31 DIAGNOSIS — Z79.4 TYPE 2 DIABETES MELLITUS WITH STAGE 3A CHRONIC KIDNEY DISEASE, WITH LONG-TERM CURRENT USE OF INSULIN: ICD-10-CM

## 2025-01-31 DIAGNOSIS — N18.31 TYPE 2 DIABETES MELLITUS WITH STAGE 3A CHRONIC KIDNEY DISEASE, WITH LONG-TERM CURRENT USE OF INSULIN: ICD-10-CM

## 2025-01-31 DIAGNOSIS — E11.22 TYPE 2 DIABETES MELLITUS WITH STAGE 3A CHRONIC KIDNEY DISEASE, WITH LONG-TERM CURRENT USE OF INSULIN: ICD-10-CM

## 2025-01-31 RX ORDER — BLOOD-GLUCOSE SENSOR
EACH MISCELLANEOUS
Qty: 9 EACH | Refills: 3 | Status: SHIPPED | OUTPATIENT
Start: 2025-01-31

## 2025-01-31 NOTE — TELEPHONE ENCOUNTER
----- Message from Aditya sent at 1/31/2025  1:57 PM CST -----  Type: Needs Medical Advice    Who Called:  Pt    Pharmacy name and phone #:    [facility] - [address] [address]  Phone: [phone] Fax: [phone]    Best Call Back Number: [phone]    Additional Information: Pt is inquiring about DEXCOM G7 sensor again. Needs to be sent to above pharm. Please call back to advise, Thanks!

## 2025-05-27 ENCOUNTER — LAB VISIT (OUTPATIENT)
Dept: PRIMARY CARE CLINIC | Facility: CLINIC | Age: 80
End: 2025-05-27
Payer: MEDICARE

## 2025-05-27 DIAGNOSIS — Z79.4 TYPE 2 DIABETES MELLITUS WITH STAGE 3A CHRONIC KIDNEY DISEASE, WITH LONG-TERM CURRENT USE OF INSULIN: ICD-10-CM

## 2025-05-27 DIAGNOSIS — E11.22 TYPE 2 DIABETES MELLITUS WITH STAGE 3A CHRONIC KIDNEY DISEASE, WITH LONG-TERM CURRENT USE OF INSULIN: ICD-10-CM

## 2025-05-27 DIAGNOSIS — N18.31 TYPE 2 DIABETES MELLITUS WITH STAGE 3A CHRONIC KIDNEY DISEASE, WITH LONG-TERM CURRENT USE OF INSULIN: ICD-10-CM

## 2025-05-27 LAB
ALBUMIN SERPL BCP-MCNC: 3.7 G/DL (ref 3.5–5.2)
ALP SERPL-CCNC: 83 UNIT/L (ref 40–150)
ALT SERPL W/O P-5'-P-CCNC: 12 UNIT/L (ref 10–44)
ANION GAP (OHS): 9 MMOL/L (ref 8–16)
AST SERPL-CCNC: 26 UNIT/L (ref 11–45)
BILIRUB SERPL-MCNC: 0.7 MG/DL (ref 0.1–1)
BUN SERPL-MCNC: 28 MG/DL (ref 8–23)
CALCIUM SERPL-MCNC: 9.1 MG/DL (ref 8.7–10.5)
CHLORIDE SERPL-SCNC: 100 MMOL/L (ref 95–110)
CHOLEST SERPL-MCNC: 115 MG/DL (ref 120–199)
CHOLEST/HDLC SERPL: 3.1 {RATIO} (ref 2–5)
CO2 SERPL-SCNC: 25 MMOL/L (ref 23–29)
CREAT SERPL-MCNC: 1.6 MG/DL (ref 0.5–1.4)
EAG (OHS): 146 MG/DL (ref 68–131)
GFR SERPLBLD CREATININE-BSD FMLA CKD-EPI: 43 ML/MIN/1.73/M2
GLUCOSE SERPL-MCNC: 100 MG/DL (ref 70–110)
HBA1C MFR BLD: 6.7 % (ref 4–5.6)
HDLC SERPL-MCNC: 37 MG/DL (ref 40–75)
HDLC SERPL: 32.2 % (ref 20–50)
LDLC SERPL CALC-MCNC: 56.8 MG/DL (ref 63–159)
NONHDLC SERPL-MCNC: 78 MG/DL
POTASSIUM SERPL-SCNC: 5 MMOL/L (ref 3.5–5.1)
PROT SERPL-MCNC: 7.3 GM/DL (ref 6–8.4)
SODIUM SERPL-SCNC: 134 MMOL/L (ref 136–145)
TRIGL SERPL-MCNC: 106 MG/DL (ref 30–150)
TSH SERPL-ACNC: 2.93 UIU/ML (ref 0.4–4)

## 2025-05-27 PROCEDURE — 82465 ASSAY BLD/SERUM CHOLESTEROL: CPT | Performed by: NURSE PRACTITIONER

## 2025-05-27 PROCEDURE — 83036 HEMOGLOBIN GLYCOSYLATED A1C: CPT | Performed by: NURSE PRACTITIONER

## 2025-05-27 PROCEDURE — 84443 ASSAY THYROID STIM HORMONE: CPT | Performed by: NURSE PRACTITIONER

## 2025-05-27 PROCEDURE — 80053 COMPREHEN METABOLIC PANEL: CPT | Performed by: NURSE PRACTITIONER

## 2025-05-28 ENCOUNTER — RESULTS FOLLOW-UP (OUTPATIENT)
Dept: ENDOCRINOLOGY | Facility: CLINIC | Age: 80
End: 2025-05-28

## 2025-05-29 DIAGNOSIS — Z79.4 TYPE 2 DIABETES MELLITUS WITH STAGE 3A CHRONIC KIDNEY DISEASE, WITH LONG-TERM CURRENT USE OF INSULIN: ICD-10-CM

## 2025-05-29 DIAGNOSIS — N18.31 TYPE 2 DIABETES MELLITUS WITH STAGE 3A CHRONIC KIDNEY DISEASE, WITH LONG-TERM CURRENT USE OF INSULIN: ICD-10-CM

## 2025-05-29 DIAGNOSIS — E11.22 TYPE 2 DIABETES MELLITUS WITH STAGE 3A CHRONIC KIDNEY DISEASE, WITH LONG-TERM CURRENT USE OF INSULIN: ICD-10-CM

## 2025-05-29 RX ORDER — GLIMEPIRIDE 2 MG/1
2 TABLET ORAL
Qty: 90 TABLET | Refills: 3 | Status: SHIPPED | OUTPATIENT
Start: 2025-05-29

## 2025-06-17 ENCOUNTER — OFFICE VISIT (OUTPATIENT)
Dept: ENDOCRINOLOGY | Facility: CLINIC | Age: 80
End: 2025-06-17
Payer: MEDICARE

## 2025-06-17 VITALS
WEIGHT: 199.75 LBS | HEIGHT: 72 IN | HEART RATE: 56 BPM | DIASTOLIC BLOOD PRESSURE: 64 MMHG | BODY MASS INDEX: 27.06 KG/M2 | SYSTOLIC BLOOD PRESSURE: 128 MMHG

## 2025-06-17 DIAGNOSIS — E11.22 TYPE 2 DIABETES MELLITUS WITH STAGE 3A CHRONIC KIDNEY DISEASE, WITH LONG-TERM CURRENT USE OF INSULIN: Primary | ICD-10-CM

## 2025-06-17 DIAGNOSIS — I25.10 CORONARY ARTERY DISEASE INVOLVING NATIVE CORONARY ARTERY OF NATIVE HEART WITHOUT ANGINA PECTORIS: ICD-10-CM

## 2025-06-17 DIAGNOSIS — N18.31 TYPE 2 DIABETES MELLITUS WITH STAGE 3A CHRONIC KIDNEY DISEASE, WITH LONG-TERM CURRENT USE OF INSULIN: Primary | ICD-10-CM

## 2025-06-17 DIAGNOSIS — Z79.4 TYPE 2 DIABETES MELLITUS WITH STAGE 3A CHRONIC KIDNEY DISEASE, WITH LONG-TERM CURRENT USE OF INSULIN: Primary | ICD-10-CM

## 2025-06-17 DIAGNOSIS — E78.2 MIXED HYPERLIPIDEMIA: ICD-10-CM

## 2025-06-17 PROCEDURE — G2211 COMPLEX E/M VISIT ADD ON: HCPCS | Mod: S$GLB,,, | Performed by: NURSE PRACTITIONER

## 2025-06-17 PROCEDURE — 3074F SYST BP LT 130 MM HG: CPT | Mod: CPTII,S$GLB,, | Performed by: NURSE PRACTITIONER

## 2025-06-17 PROCEDURE — 3288F FALL RISK ASSESSMENT DOCD: CPT | Mod: CPTII,S$GLB,, | Performed by: NURSE PRACTITIONER

## 2025-06-17 PROCEDURE — 99214 OFFICE O/P EST MOD 30 MIN: CPT | Mod: S$GLB,,, | Performed by: NURSE PRACTITIONER

## 2025-06-17 PROCEDURE — 99999 PR PBB SHADOW E&M-EST. PATIENT-LVL IV: CPT | Mod: PBBFAC,,, | Performed by: NURSE PRACTITIONER

## 2025-06-17 PROCEDURE — 3078F DIAST BP <80 MM HG: CPT | Mod: CPTII,S$GLB,, | Performed by: NURSE PRACTITIONER

## 2025-06-17 PROCEDURE — 1160F RVW MEDS BY RX/DR IN RCRD: CPT | Mod: CPTII,S$GLB,, | Performed by: NURSE PRACTITIONER

## 2025-06-17 PROCEDURE — 1101F PT FALLS ASSESS-DOCD LE1/YR: CPT | Mod: CPTII,S$GLB,, | Performed by: NURSE PRACTITIONER

## 2025-06-17 PROCEDURE — 95251 CONT GLUC MNTR ANALYSIS I&R: CPT | Mod: S$GLB,,, | Performed by: NURSE PRACTITIONER

## 2025-06-17 PROCEDURE — 1159F MED LIST DOCD IN RCRD: CPT | Mod: CPTII,S$GLB,, | Performed by: NURSE PRACTITIONER

## 2025-06-17 PROCEDURE — 1126F AMNT PAIN NOTED NONE PRSNT: CPT | Mod: CPTII,S$GLB,, | Performed by: NURSE PRACTITIONER

## 2025-06-17 RX ORDER — GLIMEPIRIDE 2 MG/1
2 TABLET ORAL
Qty: 90 TABLET | Refills: 3 | Status: SHIPPED | OUTPATIENT
Start: 2025-06-17

## 2025-06-17 RX ORDER — SULFAMETHOXAZOLE AND TRIMETHOPRIM 800; 160 MG/1; MG/1
1 TABLET ORAL 2 TIMES DAILY
COMMUNITY
Start: 2025-06-13

## 2025-06-17 NOTE — PATIENT INSTRUCTIONS
Decrease glargine dose to 14 units.     Continue glimepiride every morning.     Notify me if morning blood sugars are often greater than 150.

## 2025-06-17 NOTE — PROGRESS NOTES
CC: Mr. Felix Sheridan arrives today for management of Type 2 DM and review of chronic medical conditions, as listed in the visit diagnosis section of this encounter.     HPI: Mr. Felix Sheridan was diagnosed with Type 2 DM in 1974. He was diagnosed based on lab work. Initial treatment consisted of metformin. Insulin added in 2023. This was later stopped, due to renal function. + FH of DM in mother, brothers. Denies hospitalizations due to DM.   History of renal cell carcinoma status post partial nephrectomy years ago. He follows with nephrology (Dr. Cowan).  Patient has CAD with multiple past PCI. His cardiologist is Dr. Dykes.     Patient was last seen by me in January. Glargine dose was decreased at that time.     He states that he noticed higher glucoses after the last insulin dose decrease. He resumed 16 units since then. However, reports that when he doesn't have night snack, glucose drops overnight.     BG monitoring per Dexcom G7.     Hypoglycemia: Rare   Hypoglycemic Symptoms: nausea  Hypoglycemia Treatment: Coke or peppermints    Missing Insulin/PO medication doses: No    Exercise: Walks 2 miles 4-5 days/week    Dietary Habits: Eats 3 meals/day. Has night snack.  Avoids sugary beverages.         CURRENT DIABETIC MEDS: glimepiride 2 mg daily, insulin glargine 16 units QHS (rece[facility])  Glucometer type: Accucheck Guide  Uses pens    Previous DM treatments:  Metformin - discontinued due to renal function  Jardiance 10, 25 mg - discontinued due to renal function  Tradjenta - cost  Pioglitazone - BLE swelling  Glipizide  Toujeo - cost, formulary      Last Eye Exam: 2022, No DR, per pt. Brother-in-law is an optometrist so plans to see him  Last Podiatry Exam: n/a    REVIEW OF SYSTEMS  Constitutional: no c/o fatigue, weakness, weight loss.   Cardiac: no palpitations or chest pain.  Respiratory: no cough. + dyspnea on exertion that his cardiologist is aware of.   GI: no c/o abdominal pain or nausea.  Denies h/o pancreatitis.   Skin: no wounds, rashes. Past SCC, BCC. Sees derm regularly..   Neuro: no numbness, tingling, or parasthesias.  Endocrine: denies polyphagia, polydipsia, polyuria      Personally reviewed Past Medical, Surgical, Social History.    Vital Signs  /64   Pulse (!) 56   Ht 6' (1.829 m)   Wt 90.6 kg (199 lb 11.8 oz)   BMI 27.09 kg/m²      Personally reviewed the below labs:    Hemoglobin A1C   Date Value Ref Range Status   01/16/2025 6.9 (H) 4.0 - 5.6 % Final     Comment:     ADA Screening Guidelines:  5.7-6.4%  Consistent with prediabetes  >or=6.5%  Consistent with diabetes    High levels of fetal hemoglobin interfere with the HbA1C  assay. Heterozygous hemoglobin variants (HbS, HgC, etc)do  not significantly interfere with this assay.   However, presence of multiple variants may affect accuracy.     09/05/2024 6.5 (H) 4.0 - 5.6 % Final     Comment:     ADA Screening Guidelines:  5.7-6.4%  Consistent with prediabetes  >or=6.5%  Consistent with diabetes    High levels of fetal hemoglobin interfere with the HbA1C  assay. Heterozygous hemoglobin variants (HbS, HgC, etc)do  not significantly interfere with this assay.   However, presence of multiple variants may affect accuracy.     04/02/2024 7.1 (H) 4.0 - 5.6 % Final     Comment:     ADA Screening Guidelines:  5.7-6.4%  Consistent with prediabetes  >or=6.5%  Consistent with diabetes    High levels of fetal hemoglobin interfere with the HbA1C  assay. Heterozygous hemoglobin variants (HbS, HgC, etc)do  not significantly interfere with this assay.   However, presence of multiple variants may affect accuracy.       Hemoglobin A1c   Date Value Ref Range Status   05/27/2025 6.7 (H) 4.0 - 5.6 % Final     Comment:     ADA Screening Guidelines:  5.7-6.4%  Consistent with prediabetes  >=6.5%  Consistent with diabetes    High levels of fetal hemoglobin interfere with the HbA1C  assay. Heterozygous hemoglobin variants (HbS, HgC, etc)do  not  "significantly interfere with this assay.   However, presence of multiple variants may affect accuracy.       Chemistry        Component Value Date/Time     (L) 05/27/2025 0834     (L) 01/16/2025 0839    K 5.0 05/27/2025 0834    K 4.8 01/16/2025 0839     05/27/2025 0834     01/16/2025 0839    CO2 25 05/27/2025 0834    CO2 24 01/16/2025 0839    BUN 28 (H) 05/27/2025 0834    CREATININE 1.6 (H) 05/27/2025 0834     05/27/2025 0834     (H) 01/16/2025 0839        Component Value Date/Time    CALCIUM 9.1 05/27/2025 0834    CALCIUM 9.1 01/16/2025 0839    ALKPHOS 83 05/27/2025 0834    ALKPHOS 83 01/16/2025 0839    AST 26 05/27/2025 0834    AST 21 01/16/2025 0839    ALT 12 05/27/2025 0834    ALT 13 01/16/2025 0839    BILITOT 0.7 05/27/2025 0834    BILITOT 0.6 01/16/2025 0839    ESTGFRAFRICA 50.8 (A) 05/16/2022 1031    EGFRNONAA 44.0 (A) 05/16/2022 1031          Lab Results   Component Value Date    CHOL 115 (L) 05/27/2025    CHOL 127 04/02/2024    CHOL 134 01/04/2023     Lab Results   Component Value Date    HDL 37 (L) 05/27/2025    HDL 38 (L) 04/02/2024    HDL 44 01/04/2023     Lab Results   Component Value Date    LDLCALC 56.8 (L) 05/27/2025    LDLCALC 63.4 04/02/2024    LDLCALC 65.0 01/04/2023     Lab Results   Component Value Date    TRIG 106 05/27/2025    TRIG 128 04/02/2024    TRIG 125 01/04/2023     Lab Results   Component Value Date    CHOLHDL 32.2 05/27/2025    CHOLHDL 29.9 04/02/2024    CHOLHDL 32.8 01/04/2023       Lab Results   Component Value Date    MICALBCREAT 26.3 01/16/2025     Lab Results   Component Value Date    TSH 2.926 05/27/2025       CrCl cannot be calculated (Patient's most recent lab result is older than the maximum 7 days allowed.).    No results found for: "[id number]"      PHYSICAL EXAMINATION  Constitutional: Appears well, no distress  Respiratory: CTA, even and unlabored.  Cardiovascular: RRR, no murmurs, no carotid bruits.  Skin: warm and dry  Neuro: " oriented to person, place, time  Feet: appropriate footwear.      DEXCOM G6 DOWNLOAD: Majority of glucoses are well controlled. Sporadic prandial excursions. Occasional hypoglycemia noted overnight, once around 6 PM.          Goals        HEMOGLOBIN A1C < 8               Assessment/Plan  1. Type 2 diabetes mellitus with stage 3b chronic kidney disease, with long-term current use of insulin -- A1c at goal but will reduce insulin, due to occasional hypoglycemia. Patient would rather this than reduce glimepiride dose.   -- decrease glargine to 14 units QHS.   -- continue glimepiride 2 mg with breakfast  -- continue Dexcom G7. Will send to [facility] in [place] in the future.   -- recommended eye exam. He has outside provider he'd like to schedule with.     -- Reviewed hypoglycemia management: treat with 4 oz of juice, 4 oz regular soda, or 4 glucose tablets. Monitor and repeat treatment every 15 minutes until BG is >70 Then have a snack, which includes 15 grams of complex carbohydrates and protein.   Advised patient to check BG before activities, such as driving or exercise.  -- takes statin, ARB, aspirin   2. Coronary artery disease involving native coronary artery of native heart without angina pectoris  -- stable  -- follows with cardiology   3. Mixed hyperlipidemia  -- controlled  -- continue pravastatin         FOLLOW UP  Follow up in about 4 months (around 10/17/2025).   Patient instructed to bring BG logs to each follow up   Patient encouraged to call for any BG/medication issues, concerns, or questions.      Orders Placed This Encounter   Procedures    Hemoglobin A1C    Basic Metabolic Panel

## 2025-07-30 PROBLEM — R31.9 HEMATURIA: Status: ACTIVE | Noted: 2025-07-30

## 2025-07-30 PROBLEM — N18.31 CKD STAGE 3A, GFR 45-59 ML/MIN: Status: ACTIVE | Noted: 2020-08-06

## 2025-07-30 PROBLEM — Z71.89 ACP (ADVANCE CARE PLANNING): Status: ACTIVE | Noted: 2025-07-30

## 2025-07-30 PROBLEM — G45.9 TIA (TRANSIENT ISCHEMIC ATTACK): Status: ACTIVE | Noted: 2025-07-30

## 2025-07-30 PROBLEM — E87.1 HYPONATREMIA: Status: ACTIVE | Noted: 2025-07-30
